# Patient Record
Sex: FEMALE | Race: BLACK OR AFRICAN AMERICAN | NOT HISPANIC OR LATINO | Employment: UNEMPLOYED | URBAN - METROPOLITAN AREA
[De-identification: names, ages, dates, MRNs, and addresses within clinical notes are randomized per-mention and may not be internally consistent; named-entity substitution may affect disease eponyms.]

---

## 2020-02-18 PROBLEM — J32.0 CHRONIC MAXILLARY SINUSITIS: Status: ACTIVE | Noted: 2020-02-18

## 2020-02-18 PROBLEM — J34.3 HYPERTROPHY OF INFERIOR NASAL TURBINATE: Status: ACTIVE | Noted: 2020-02-18

## 2021-11-01 PROBLEM — Z87.39 HISTORY OF JUVENILE RHEUMATOID ARTHRITIS: Status: ACTIVE | Noted: 2018-01-04

## 2022-02-08 PROBLEM — J01.10 ACUTE NON-RECURRENT FRONTAL SINUSITIS: Status: ACTIVE | Noted: 2022-02-08

## 2022-03-29 ENCOUNTER — OFFICE VISIT (OUTPATIENT)
Dept: GASTROENTEROLOGY | Facility: CLINIC | Age: 42
End: 2022-03-29
Payer: MEDICAID

## 2022-03-29 VITALS
BODY MASS INDEX: 36.73 KG/M2 | WEIGHT: 194.56 LBS | HEART RATE: 83 BPM | DIASTOLIC BLOOD PRESSURE: 80 MMHG | SYSTOLIC BLOOD PRESSURE: 118 MMHG | HEIGHT: 61 IN

## 2022-03-29 DIAGNOSIS — R07.9 CHEST PAIN, UNSPECIFIED TYPE: ICD-10-CM

## 2022-03-29 DIAGNOSIS — K21.9 GASTROESOPHAGEAL REFLUX DISEASE, UNSPECIFIED WHETHER ESOPHAGITIS PRESENT: ICD-10-CM

## 2022-03-29 DIAGNOSIS — K21.00 GASTROESOPHAGEAL REFLUX DISEASE WITH ESOPHAGITIS WITHOUT HEMORRHAGE: ICD-10-CM

## 2022-03-29 DIAGNOSIS — R07.9 CHEST PAIN AT REST: ICD-10-CM

## 2022-03-29 PROCEDURE — 3008F BODY MASS INDEX DOCD: CPT | Mod: CPTII,S$GLB,, | Performed by: INTERNAL MEDICINE

## 2022-03-29 PROCEDURE — 1160F PR REVIEW ALL MEDS BY PRESCRIBER/CLIN PHARMACIST DOCUMENTED: ICD-10-PCS | Mod: CPTII,S$GLB,, | Performed by: INTERNAL MEDICINE

## 2022-03-29 PROCEDURE — 3079F DIAST BP 80-89 MM HG: CPT | Mod: CPTII,S$GLB,, | Performed by: INTERNAL MEDICINE

## 2022-03-29 PROCEDURE — 1159F MED LIST DOCD IN RCRD: CPT | Mod: CPTII,S$GLB,, | Performed by: INTERNAL MEDICINE

## 2022-03-29 PROCEDURE — 3008F PR BODY MASS INDEX (BMI) DOCUMENTED: ICD-10-PCS | Mod: CPTII,S$GLB,, | Performed by: INTERNAL MEDICINE

## 2022-03-29 PROCEDURE — 99204 PR OFFICE/OUTPT VISIT, NEW, LEVL IV, 45-59 MIN: ICD-10-PCS | Mod: S$PBB,,, | Performed by: INTERNAL MEDICINE

## 2022-03-29 PROCEDURE — 3074F SYST BP LT 130 MM HG: CPT | Mod: CPTII,S$GLB,, | Performed by: INTERNAL MEDICINE

## 2022-03-29 PROCEDURE — 99999 PR PBB SHADOW E&M-EST. PATIENT-LVL IV: CPT | Mod: PBBFAC,,, | Performed by: INTERNAL MEDICINE

## 2022-03-29 PROCEDURE — 3074F PR MOST RECENT SYSTOLIC BLOOD PRESSURE < 130 MM HG: ICD-10-PCS | Mod: CPTII,S$GLB,, | Performed by: INTERNAL MEDICINE

## 2022-03-29 PROCEDURE — 99204 OFFICE O/P NEW MOD 45 MIN: CPT | Mod: S$PBB,,, | Performed by: INTERNAL MEDICINE

## 2022-03-29 PROCEDURE — 99999 PR PBB SHADOW E&M-EST. PATIENT-LVL IV: ICD-10-PCS | Mod: PBBFAC,,, | Performed by: INTERNAL MEDICINE

## 2022-03-29 PROCEDURE — 1160F RVW MEDS BY RX/DR IN RCRD: CPT | Mod: CPTII,S$GLB,, | Performed by: INTERNAL MEDICINE

## 2022-03-29 PROCEDURE — 1159F PR MEDICATION LIST DOCUMENTED IN MEDICAL RECORD: ICD-10-PCS | Mod: CPTII,S$GLB,, | Performed by: INTERNAL MEDICINE

## 2022-03-29 PROCEDURE — 3079F PR MOST RECENT DIASTOLIC BLOOD PRESSURE 80-89 MM HG: ICD-10-PCS | Mod: CPTII,S$GLB,, | Performed by: INTERNAL MEDICINE

## 2022-03-29 NOTE — ASSESSMENT & PLAN NOTE
Plan:   -Continue PPI Po daily   -EGD ordered   -GERD lifestyle modifications  -Patient was counseled on weight loss   -Avoid trigger foods

## 2022-03-29 NOTE — PROGRESS NOTES
Clinic Consult:  [facility] Consultation Note    Reason for Consult:  Diagnoses of Chest pain at rest, Gastroesophageal reflux disease with esophagitis without hemorrhage, Chest pain, unspecified type, and Gastroesophageal reflux disease, unspecified whether esophagitis present were pertinent to this visit.    PCP: Trista Key   [address] [facility] / [facility] [id number]    HPI:  This is a 41 y.o. female here for evaluation of GERD.   She was diagnosed with COVID-19 in 1/2022.   After her diagnosis, she began to experience chest pains.   She was evaluated in the ED.   Told her symptoms were not cardiac related.   I reviewed notes, and troponin, D Dimer and EKG were normal.   CTA was normal.   She was given Pepcid.   Pepcid did not work.   She was started on Prilosec and it appears to control symptoms.   She cut fried food, acidic foods, chocolate and mints from her diet.   She started walking and has lost at least 10 pounds.     ROS:  See above HPI         Medical History:   Past Medical History:   Diagnosis Date    Deviated septum     Encounter for general adult medical examination without abnormal findings 09/22/2014    Hyperkalemia        Surgical History:  Past Surgical History:   Procedure Laterality Date    PLANTAR FASCIA SURGERY      SINUS SURGERY         Family History:   Family History   Problem Relation Age of Onset    Cancer Mother     Diabetes Mother     Hypertension Mother     Heart disease Father     Hypertension Sister     Asthma Daughter        Social History:   Social History     Tobacco Use    Smoking status: Never Smoker    Smokeless tobacco: Never Used   Substance Use Topics    Alcohol use: Yes    Drug use: No       Allergies: Reviewed    Home Medications:   Medication List with Changes/Refills   Current Medications    FAMOTIDINE (PEPCID) 20 MG TABLET    Take 20 mg by mouth 2 (two) times daily.    FLUCONAZOLE (DIFLUCAN) 150 MG TAB    Take one tab po x 1 then  "repeat 48 hours    LEVONORGESTREL (MIRENA) 20 MCG/24 HOURS (5 YRS) 52 MG IUD    1 each by Intrauterine route once.    OMEPRAZOLE (PRILOSEC) 40 MG CAPSULE    Take 1 capsule (40 mg total) by mouth every morning.         Physical Exam:  Vital Signs:  /80   Pulse 83   Ht 5' 1" (1.549 m)   Wt 88.3 kg (194 lb 8.9 oz)   BMI 36.76 kg/m²   Body mass index is 36.76 kg/m².    Physical Exam  Constitutional:       Appearance: Normal appearance.   HENT:      Mouth/Throat:      Mouth: Mucous membranes are moist.   Eyes:      Conjunctiva/sclera: Conjunctivae normal.   Abdominal:      General: There is no distension.      Palpations: Abdomen is soft.      Tenderness: There is no abdominal tenderness. There is no guarding.   Neurological:      Mental Status: She is alert.          Labs: Pertinent labs reviewed.      Assessment:  Problem List Items Addressed This Visit        GI    GERD (gastroesophageal reflux disease)    Current Assessment & Plan     Plan:   -Continue PPI PO daily   -EGD ordered   -GERD lifestyle modifications  -Patient was counseled on weight loss   -Avoid trigger foods              Other    Chest pain      Other Visit Diagnoses               Gastroesophageal reflux disease with esophagitis without hemorrhage  -     Ambulatory referral/consult to Gastroenterology  -     Case Request Endoscopy: EGD (ESOPHAGOGASTRODUODENOSCOPY)    Chest pain, unspecified type                  No follow-ups on file.    Order summary:  No orders of the defined types were placed in this encounter.      Thank you so much for allowing me to participate in the care of Olegario Newsome MD  Gastroenterology and Hepatology  [facility]    "

## 2022-05-03 ENCOUNTER — ANESTHESIA EVENT (OUTPATIENT)
Dept: ENDOSCOPY | Facility: HOSPITAL | Age: 42
End: 2022-05-03
Payer: COMMERCIAL

## 2022-05-03 NOTE — ANESTHESIA PREPROCEDURE EVALUATION
05/03/2022  Olegario Presley is a 41 y.o., female.  Past Medical History:   Diagnosis Date    Deviated septum     Encounter for general adult medical examination without abnormal findings 09/22/2014    Hyperkalemia      Past Surgical History:   Procedure Laterality Date    PLANTAR FASCIA SURGERY      SINUS SURGERY             Pre-op Assessment    I have reviewed the Patient Summary Reports.     I have reviewed the Nursing Notes. I have reviewed the NPO Status.   I have reviewed the Medications.     Review of Systems  Anesthesia Hx:  No problems with previous Anesthesia  Denies Family Hx of Anesthesia complications.   Denies Personal Hx of Anesthesia complications.   Social:  Alcohol Use, Non-Smoker    Hematology/Oncology:  Hematology Normal   Oncology Normal     EENT/Dental:EENT/Dental Normal   Cardiovascular:  Cardiovascular Normal  Denies MI.  Denies CAD.     Denies Angina.    Pulmonary:  Pulmonary Normal    Renal/:  Renal/ Normal     Hepatic/GI:   GERD    Musculoskeletal:  Musculoskeletal Normal    Neurological:  Neurology Normal    Endocrine:  Obesity / BMI > 30  Dermatological:  Skin Normal    Psych:  Psychiatric Normal           Physical Exam  General: Well nourished, Cooperative, Alert and Oriented    Airway:  Mallampati: II   Mouth Opening: Normal  TM Distance: Normal  Tongue: Normal  Neck ROM: Normal ROM    Dental:  Intact        Anesthesia Plan  Type of Anesthesia, risks & benefits discussed:    Anesthesia Type: Gen Natural Airway  Intra-op Monitoring Plan: Standard ASA Monitors  Post Op Pain Control Plan: multimodal analgesia  Induction:  IV  Informed Consent: Informed consent signed with the Patient and all parties understand the risks and agree with anesthesia plan.  All questions answered. Patient consented to blood products? No  ASA Score: 2  Day of Surgery Review of History & Physical:  H&P Update referred to the surgeon/provider.    Ready For Surgery From Anesthesia Perspective.     .

## 2022-05-03 NOTE — PRE-PROCEDURE INSTRUCTIONS
PAT call completed and patient educated on procedure instructions. Medical history discussed and patient informed of arrival time 0930am on Thursday, May 03, at [facility].      Pt will be accompanied by mother and is made  aware of limited-visitor policy, and that  is to remain during entire visit. All questions and concerns addressed.      For any needs on morning of procedure, call nurse's desk at [phone].        Endoscopy instructions reviewed. Informed to take no AM medications.     Nothing by mouth 12MN on Wednesday night.     Patient verbalizes understanding of teaching and all instructions.       Your procedure will be performed at [facility]. Many GPS systems are NOT providing accurate instructions, take [address], from either direction, to [address] and proceed to the eastbound service road, turning between the [facility] and [facility]. Once at the [facility], look for signs directing you to Hospital/Surgery. Check in for your procedure at  for Hospital/Surgery. [phone].

## 2022-05-05 ENCOUNTER — ANESTHESIA (OUTPATIENT)
Dept: ENDOSCOPY | Facility: HOSPITAL | Age: 42
End: 2022-05-05
Payer: COMMERCIAL

## 2022-05-05 ENCOUNTER — HOSPITAL ENCOUNTER (OUTPATIENT)
Facility: HOSPITAL | Age: 42
Discharge: HOME | End: 2022-05-05
Attending: INTERNAL MEDICINE | Admitting: INTERNAL MEDICINE
Payer: COMMERCIAL

## 2022-05-05 VITALS
RESPIRATION RATE: 14 BRPM | HEART RATE: 78 BPM | DIASTOLIC BLOOD PRESSURE: 83 MMHG | SYSTOLIC BLOOD PRESSURE: 133 MMHG | BODY MASS INDEX: 36.13 KG/M2 | HEIGHT: 61 IN | OXYGEN SATURATION: 100 % | TEMPERATURE: 98 F | WEIGHT: 191.38 LBS

## 2022-05-05 DIAGNOSIS — K21.9 GASTROESOPHAGEAL REFLUX DISEASE, UNSPECIFIED WHETHER ESOPHAGITIS PRESENT: Primary | ICD-10-CM

## 2022-05-05 DIAGNOSIS — K21.9 GERD (GASTROESOPHAGEAL REFLUX DISEASE): ICD-10-CM

## 2022-05-05 PROCEDURE — D9220A PRA ANESTHESIA: Mod: CRNA,,, | Performed by: NURSE ANESTHETIST, CERTIFIED REGISTERED

## 2022-05-05 PROCEDURE — 63600175 PHARM REV CODE 636 W HCPCS: Performed by: INTERNAL MEDICINE

## 2022-05-05 PROCEDURE — 37000008 HC ANESTHESIA 1ST 15 MINUTES: Performed by: INTERNAL MEDICINE

## 2022-05-05 PROCEDURE — 27201012 HC FORCEPS, HOT/COLD, DISP: Performed by: INTERNAL MEDICINE

## 2022-05-05 PROCEDURE — 88305 TISSUE EXAM BY PATHOLOGIST: CPT | Performed by: PATHOLOGY

## 2022-05-05 PROCEDURE — 88305 TISSUE EXAM BY PATHOLOGIST: ICD-10-PCS | Mod: 26,,, | Performed by: PATHOLOGY

## 2022-05-05 PROCEDURE — 43239 EGD BIOPSY SINGLE/MULTIPLE: CPT | Mod: ,,, | Performed by: INTERNAL MEDICINE

## 2022-05-05 PROCEDURE — 43239 EGD BIOPSY SINGLE/MULTIPLE: CPT | Performed by: INTERNAL MEDICINE

## 2022-05-05 PROCEDURE — D9220A PRA ANESTHESIA: ICD-10-PCS | Mod: CRNA,,, | Performed by: NURSE ANESTHETIST, CERTIFIED REGISTERED

## 2022-05-05 PROCEDURE — 88305 TISSUE EXAM BY PATHOLOGIST: CPT | Mod: 26,,, | Performed by: PATHOLOGY

## 2022-05-05 PROCEDURE — D9220A PRA ANESTHESIA: ICD-10-PCS | Mod: ANES,,, | Performed by: ANESTHESIOLOGY

## 2022-05-05 PROCEDURE — 63600175 PHARM REV CODE 636 W HCPCS: Performed by: NURSE ANESTHETIST, CERTIFIED REGISTERED

## 2022-05-05 PROCEDURE — D9220A PRA ANESTHESIA: Mod: ANES,,, | Performed by: ANESTHESIOLOGY

## 2022-05-05 PROCEDURE — 43239 PR EGD, FLEX, W/BIOPSY, SGL/MULTI: ICD-10-PCS | Mod: ,,, | Performed by: INTERNAL MEDICINE

## 2022-05-05 RX ORDER — SODIUM CHLORIDE, SODIUM LACTATE, POTASSIUM CHLORIDE, CALCIUM CHLORIDE 600; 310; 30; 20 MG/100ML; MG/100ML; MG/100ML; MG/100ML
INJECTION, SOLUTION INTRAVENOUS CONTINUOUS
Status: DISCONTINUED | OUTPATIENT
Start: 2022-05-05 | End: 2022-05-05 | Stop reason: HOSPADM

## 2022-05-05 RX ORDER — LIDOCAINE HCL/PF 100 MG/5ML
SYRINGE (ML) INTRAVENOUS
Status: DISCONTINUED | OUTPATIENT
Start: 2022-05-05 | End: 2022-05-05

## 2022-05-05 RX ORDER — PROPOFOL 10 MG/ML
INJECTION, EMULSION INTRAVENOUS
Status: DISCONTINUED | OUTPATIENT
Start: 2022-05-05 | End: 2022-05-05

## 2022-05-05 RX ADMIN — SODIUM CHLORIDE, SODIUM LACTATE, POTASSIUM CHLORIDE, AND CALCIUM CHLORIDE: 600; 310; 30; 20 INJECTION, SOLUTION INTRAVENOUS at 10:05

## 2022-05-05 RX ADMIN — SODIUM CHLORIDE, SODIUM LACTATE, POTASSIUM CHLORIDE, AND CALCIUM CHLORIDE: 600; 310; 30; 20 INJECTION, SOLUTION INTRAVENOUS at 09:05

## 2022-05-05 RX ADMIN — PROPOFOL 150 MG: 10 INJECTION, EMULSION INTRAVENOUS at 11:05

## 2022-05-05 RX ADMIN — PROPOFOL 20 MG: 10 INJECTION, EMULSION INTRAVENOUS at 11:05

## 2022-05-05 RX ADMIN — Medication 50 MG: at 11:05

## 2022-05-05 NOTE — ANESTHESIA POSTPROCEDURE EVALUATION
Anesthesia Post Evaluation    Patient: Olegario Presley    Procedure(s) Performed: Procedure(s) (LRB):  EGD (ESOPHAGOGASTRODUODENOSCOPY) (N/A)    Final Anesthesia Type: general      Patient location during evaluation: PACU  Patient participation: Yes- Able to Participate  Level of consciousness: awake and alert and oriented  Post-procedure vital signs: reviewed and stable  Pain management: adequate  Airway patency: patent    PONV status at discharge: No PONV  Anesthetic complications: no      Cardiovascular status: blood pressure returned to baseline, stable and hemodynamically stable  Respiratory status: unassisted  Hydration status: euvolemic  Follow-up not needed.          Vitals Value Taken Time   /83 05/05/22 1142   Temp 36.5 °C (97.7 °F) 05/05/22 1112   Pulse 78 05/05/22 1142   Resp 19 05/05/22 1142   SpO2 100 % 05/05/22 1142   Vitals shown include unvalidated device data.      Event Time   Out of Recovery 11:42:00         Pain/Fernando Score: Fernando Score: 10 (5/5/2022 11:42 AM)

## 2022-05-05 NOTE — H&P
PRE PROCEDURE H&P    Patient Name: Olegario Presley  MRN: [id number]  : 1980  Date of Procedure:  2022  Referring Physician: Trista Key MD  Primary Physician: Trista Key MD  Procedure Physician: Shreyas Newsome MD       Planned Procedure: EGD  Diagnosis: GERD  Chief Complaint: Same as above    HPI:   This is a 41 y.o. female here for evaluation of GERD.   She was diagnosed with COVID-19 in 2022.   After her diagnosis, she began to experience chest pains.   She was evaluated in the ED.   Told her symptoms were not cardiac related.   I reviewed notes, and troponin, D Dimer and EKG were normal.   CTA was normal.   She was given Pepcid.   Pepcid did not work.   She was started on Prilosec and it appears to control symptoms.   She cut fried food, acidic foods, chocolate and mints from her diet.   She started walking and has lost at least 10 pounds.          Past Medical History:   Past Medical History:   Diagnosis Date    Deviated septum     Encounter for general adult medical examination without abnormal findings 2014    Hyperkalemia         Past Surgical History:  Past Surgical History:   Procedure Laterality Date    PLANTAR FASCIA SURGERY      SINUS SURGERY          Home Medications:  Prior to Admission medications    Medication Sig Start Date End Date Taking? Authorizing Provider   omeprazole (PRILOSEC) 40 MG capsule Take 1 capsule (40 mg total) by mouth every morning. 22  Yes Trista Key MD   famotidine (PEPCID) 20 MG tablet Take 20 mg by mouth 2 (two) times daily. 22   Historical Provider   fluconazole (DIFLUCAN) 150 MG Tab Take one tab po x 1 then repeat 48 hours  Patient not taking: Reported on 3/29/2022 2/8/22   Trista Key MD   levonorgestrel (MIRENA) 20 mcg/24 hours (5 yrs) 52 mg IUD 1 each by Intrauterine route once.    Historical Provider        Allergies:  Review of patient's allergies indicates:  No Known Allergies     Social History:  "  Social History     Socioeconomic History    Marital status:    Tobacco Use    Smoking status: Never Smoker    Smokeless tobacco: Never Used   Substance and Sexual Activity    Alcohol use: Yes    Drug use: No    Sexual activity: Yes     Partners: Male     Birth control/protection: I.U.D.       Family History:  Family History   Problem Relation Age of Onset    Cancer Mother     Diabetes Mother     Hypertension Mother     Heart disease Father     Hypertension Sister     Asthma Daughter        ROS: No acute cardiac events, no acute respiratory complaints.     Physical Exam (all patients):    BP (!) 150/89 (BP Location: Right arm, Patient Position: Sitting)   Pulse 77   Temp 97.7 °F (36.5 °C) (Temporal)   Resp 16   Ht 5' 1" (1.549 m)   Wt 86.8 kg (191 lb 5.8 oz)   LMP 04/01/2022 (Approximate)   SpO2 100%   Breastfeeding No   BMI 36.16 kg/m²   Lungs: Clear to auscultation bilaterally, respirations unlabored  Heart: Regular rate and rhythm, S1 and S2 normal, no obvious murmurs  Abdomen:         Soft, non-tender, bowel sounds normal, no masses, no organomegaly    Lab Results   Component Value Date    WBC 5.3 09/28/2017    MCV 84 09/28/2017    RDW 15.3 09/28/2017     09/28/2017        SEDATION PLAN: per anesthesia      History reviewed, vital signs satisfactory, cardiopulmonary status satisfactory, sedation options, risks and plans have been discussed with the patient  All their questions were answered and the patient agrees to the sedation procedures as planned and the patient is deemed an appropriate candidate for the sedation as planned.    Procedure explained to patient, informed consent obtained and placed in chart.    Shreyas Newsome  5/5/2022  10:49 AM     "

## 2022-05-05 NOTE — PROVATION PATIENT INSTRUCTIONS
Discharge Summary/Instructions after an Endoscopic Procedure  Patient Name: Olegario Presley  Patient MRN: [id number]  Patient YOB: 1980  Thursday, May 5, 2022  Shreyas Newsome MD  Dear patient,  As a result of recent federal legislation (The Federal Cures Act), you may   receive lab or pathology results from your procedure in your [facility]   account before your physician is able to contact you. Your physician or   their representative will relay the results to you with their   recommendations at their soonest availability.  Thank you,  RESTRICTIONS:  During your procedure today, you received medications for sedation.  These   medications may affect your judgment, balance and coordination.  Therefore,   for 24 hours, you have the following restrictions:   - DO NOT drive a car, operate machinery, make legal/financial decisions,   sign important papers or drink alcohol.    ACTIVITY:  Today: no heavy lifting, straining or running due to procedural   sedation/anesthesia.  The following day: return to full activity including work.  DIET:  Eat and drink normally unless instructed otherwise.     TREATMENT FOR COMMON SIDE EFFECTS:  - Mild abdominal pain, nausea, belching, bloating or excessive gas:  rest,   eat lightly and use a heating pad.  - Sore Throat: treat with throat lozenges and/or gargle with warm salt   water.  - Because air was used during the procedure, expelling large amounts of air   from your rectum or belching is normal.  - If a bowel prep was taken, you may not have a bowel movement for 1-3 days.    This is normal.  SYMPTOMS TO WATCH FOR AND REPORT TO YOUR PHYSICIAN:  1. Abdominal pain or bloating, other than gas cramps.  2. Chest pain.  3. Back pain.  4. Signs of infection such as: chills or fever occurring within 24 hours   after the procedure.  5. Rectal bleeding, which would show as bright red, maroon, or black stools.   (A tablespoon of blood from the rectum is not serious, especially  if   hemorrhoids are present.)  6. Vomiting.  7. Weakness or dizziness.  GO DIRECTLY TO THE NEAREST EMERGENCY ROOM IF YOU HAVE ANY OF THE FOLLOWING:      Difficulty breathing              Chills and/or fever over 101 F   Persistent vomiting and/or vomiting blood   Severe abdominal pain   Severe chest pain   Black, tarry stools   Bleeding- more than one tablespoon   Any other symptom or condition that you feel may need urgent attention  Your doctor recommends these additional instructions:  If any biopsies were taken, your doctors clinic will contact you in 1 to 2   weeks with any results.  - Discharge patient to home.   - Resume previous diet.   - Continue present medications.   - Await pathology results.   - Return to referring physician.  For questions, problems or results please call your physician Shreyas Newsome MD at Work:  [phone]  If you have any questions about the above instructions, call the GI   department at [phone] or call the endoscopy unit at [phone]   from 7am until 3 pm.  [facility], EMERGENCY ROOM PHONE NUMBER:   [phone]  IF A COMPLICATION OR EMERGENCY SITUATION ARISES AND YOU ARE UNABLE TO REACH   YOUR PHYSICIAN - GO DIRECTLY TO THE EMERGENCY ROOM.  I have read or have had read to me these discharge instructions for my   procedure and have received a written copy.  I understand these   instructions and will follow-up with my physician if I have any questions.     __________________________________       _____________________________________  Nurse Signature                                          Patient/Designated   Responsible Party Signature  Shreyas Newsome MD  5/5/2022 11:09:21 AM  This report has been verified and signed electronically.  Dear patient,  As a result of recent federal legislation (The Federal Cures Act), you may   receive lab or pathology results from your procedure in your [facility]   account before your  physician is able to contact you. Your physician or   their representative will relay the results to you with their   recommendations at their soonest availability.  Thank you,  PROVATION

## 2022-05-05 NOTE — TRANSFER OF CARE
"Anesthesia Transfer of Care Note    Patient: Olegario Presley    Procedure(s) Performed: Procedure(s) (LRB):  EGD (ESOPHAGOGASTRODUODENOSCOPY) (N/A)    Patient location: PACU    Anesthesia Type: general    Transport from OR: Transported from OR on room air with adequate spontaneous ventilation    Post pain: adequate analgesia    Post assessment: no apparent anesthetic complications and tolerated procedure well    Post vital signs: stable    Level of consciousness: awake    Nausea/Vomiting: no nausea/vomiting    Complications: none    Transfer of care protocol was followed      Last vitals:   Visit Vitals  BP (!) 150/89 (BP Location: Right arm, Patient Position: Sitting)   Pulse 77   Temp 36.5 °C (97.7 °F) (Temporal)   Resp 16   Ht 5' 1" (1.549 m)   Wt 86.8 kg (191 lb 5.8 oz)   LMP 04/01/2022 (Approximate)   SpO2 100%   Breastfeeding No   BMI 36.16 kg/m²     "

## 2022-05-12 LAB
FINAL PATHOLOGIC DIAGNOSIS: NORMAL
GROSS: NORMAL
Lab: NORMAL

## 2022-05-16 PROBLEM — J01.10 ACUTE NON-RECURRENT FRONTAL SINUSITIS: Status: RESOLVED | Noted: 2022-02-08 | Resolved: 2022-05-16

## 2022-08-08 PROBLEM — R06.02 SHORTNESS OF BREATH: Status: ACTIVE | Noted: 2022-08-08

## 2022-08-08 PROBLEM — F41.1 GAD (GENERALIZED ANXIETY DISORDER): Status: ACTIVE | Noted: 2022-08-08

## 2022-08-08 PROBLEM — R00.2 PALPITATIONS: Status: ACTIVE | Noted: 2022-08-08

## 2022-09-02 PROBLEM — T78.40XA ALLERGIC REACTION CAUSED BY A DRUG: Status: ACTIVE | Noted: 2022-09-02

## 2022-10-17 PROBLEM — A08.4 VIRAL GASTROENTERITIS: Status: ACTIVE | Noted: 2022-10-17

## 2023-03-28 ENCOUNTER — PATIENT MESSAGE (OUTPATIENT)
Dept: RESEARCH | Facility: HOSPITAL | Age: 43
End: 2023-03-28
Payer: COMMERCIAL

## 2023-04-20 PROBLEM — A08.4 VIRAL GASTROENTERITIS: Status: RESOLVED | Noted: 2022-10-17 | Resolved: 2023-04-20

## 2023-04-20 PROBLEM — R06.02 SHORTNESS OF BREATH: Status: RESOLVED | Noted: 2022-08-08 | Resolved: 2023-04-20

## 2023-04-20 PROBLEM — R07.9 CHEST PAIN: Status: RESOLVED | Noted: 2022-03-29 | Resolved: 2023-04-20

## 2023-04-20 PROBLEM — R00.2 PALPITATIONS: Status: RESOLVED | Noted: 2022-08-08 | Resolved: 2023-04-20

## 2023-06-02 PROBLEM — E66.01 SEVERE OBESITY (BMI 35.0-39.9) WITH COMORBIDITY: Status: ACTIVE | Noted: 2023-06-02

## 2023-06-05 PROBLEM — I10 PRIMARY HYPERTENSION: Status: ACTIVE | Noted: 2023-06-05

## 2023-06-05 PROBLEM — E78.2 MIXED HYPERLIPIDEMIA: Status: ACTIVE | Noted: 2023-06-05

## 2023-06-05 PROBLEM — E88.819 INSULIN RESISTANCE: Status: ACTIVE | Noted: 2023-06-05

## 2023-07-06 PROBLEM — R00.2 PALPITATIONS: Status: ACTIVE | Noted: 2023-07-06

## 2024-07-22 PROBLEM — M54.12 CERVICAL RADICULOPATHY: Status: ACTIVE | Noted: 2024-07-22

## 2024-07-31 NOTE — PROGRESS NOTES
New Patient Chronic Pain Note (Initial Visit)    Referring Physician: Trista Key MD    PCP: Trista Key MD    Chief Complaint:   Chief Complaint   Patient presents with    Neck Pain        SUBJECTIVE:    Olegario Presley is a 43 y.o. female with past medical history significant for generalized anxiety disorder, hyperlipidemia, hypertension, obesity, insulin resistance, GERD, sleep apnea who presents to the clinic for the evaluation of neck, head, arm and left knee pain.  Patient reports neck pain has been present since June without inciting accident injury or trauma.  Pain is intermittent and today is rated a 2/10.  Pain is described as sharp and stabbing in nature.  Patient reports pain in bilateral cervical paraspinous musculature which can radiate superiorly and greater and lesser occipital nerve distributions.  She does report generalized fatigue in the upper extremities and numbness and tingling in the fingertips but denies outright cervical radiculopathy.  Pain in the neck and arms can be exacerbated with cervical flexion/extension lateral rotation as well as use of the upper extremities.  Pain can be improved with heat, sitting still and recent prescription of gabapentin from her PCP, Dr. Slater.  Patient is currently taking gabapentin 300 mg twice daily.  She denies any side effects from this medication.  Patient has been performing physician directed physical therapy exercises for neck and arm pain over the last 8 weeks from 06/01/2024 through 08/01/2024 with marginal improvement in her symptoms.  Patient has not received prior interventional treatment.    She also reports new onset left knee pain.  This pain has been present for 1 day without inciting accident injury or trauma.  Patient reports pain along the suprapatellar aspect of the left knee, which is worse with standing and ambulation.  She denies significant lower extremity weakness.  She has never received interventional treatment  for knee pain.    Patient denies night fever/night sweats, urinary incontinence, bowel incontinence, significant weight loss, and significant motor weakness.  Patient reports loss of sensations.      Pain Disability Index Review:         8/1/2024     8:07 AM   Last 3 PDI Scores   Pain Disability Index (PDI) 12       Non-Pharmacologic Treatments:  Physical Therapy/Home Exercise: yes  Ice/Heat:yes  TENS: no  Acupuncture: no  Massage: no  Chiropractic: no    Other: no      Pain Medications:  - Adjuvant Medications: Cyclobenzaprine (Flexeril) and Neurontin (Gabapentin)    Pain Procedures:   None    Past Medical History:   Diagnosis Date    Allergy     Deviated septum     Encounter for general adult medical examination without abnormal findings 09/22/2014    GERD (gastroesophageal reflux disease)     Hyperkalemia     Sleep apnea      Past Surgical History:   Procedure Laterality Date    ESOPHAGOGASTRODUODENOSCOPY N/A 5/5/2022    Procedure: EGD (ESOPHAGOGASTRODUODENOSCOPY);  Surgeon: Shreyas Newsome MD;  Location: [facility];  Service: Gastroenterology;  Laterality: N/A;    PLANTAR FASCIA SURGERY      SINUS SURGERY       Review of patient's allergies indicates:  No Known Allergies    Current Outpatient Medications   Medication Sig    busPIRone (BUSPAR) 7.5 MG tablet Take 1 tablet (7.5 mg total) by mouth 2 (two) times a day.    cyclobenzaprine (FLEXERIL) 10 MG tablet Take 1 tablet (10 mg total) by mouth every evening. for 10 days    gabapentin (NEURONTIN) 300 MG capsule Take 1 capsule (300 mg total) by mouth 2 (two) times daily.    levonorgestrel (MIRENA) 20 mcg/24 hours (5 yrs) 52 mg IUD 1 each by Intrauterine route once.    metFORMIN (GLUCOPHAGE-XR) 500 MG ER 24hr tablet 4 tabs poqd    metoprolol succinate (TOPROL XL) 50 MG 24 hr tablet Take 1 tablet (50 mg total) by mouth every evening.     No current facility-administered medications for this visit.         ROS:  GENERAL:  No weight loss, malaise or fevers.  HEENT:   " No recent changes in vision or hearing  NECK:  Negative for lumps, no difficulty with swallowing.  RESPIRATORY:  Negative for cough, wheezing or shortness of breath, patient denies any recent URI.  CARDIOVASCULAR:  Negative for chest pain or palpitations.  GI:  Negative for abdominal discomfort, blood in stools or black stools or change in bowel habits.  MUSCULOSKELETAL:  See HPI.  SKIN:  Negative for lesions, rash, and itching.  PSYCH:  No mood disorder or recent psychosocial stressors.   HEMATOLOGY/LYMPHOLOGY:  Negative for prolonged bleeding, bruising easily or swollen nodes.    NEURO:   No history of syncope, paralysis, seizures or tremors.  All other reviewed and negative other than HPI.    OBJECTIVE:    /78   Pulse (P) 78   Resp 17   Ht 5' 1" (1.549 m)   Wt 81 kg (178 lb 9.2 oz)   LMP  (LMP Unknown)   BMI 33.74 kg/m²       Physical Exam:    GENERAL: Well appearing, in no acute distress, alert and oriented x3.  PSYCH:  Mood and affect appropriate.  SKIN: Skin color, texture, turgor normal, no rashes or lesions.  HEAD/FACE:  Normocephalic, atraumatic. Cranial nerves grossly intact.    NECK:  pain to palpation over the cervical paraspinous muscles. Spurling Negative.  pain with neck flexion, extension, or lateral flexion. Full ROM  Normaltesting biceps, triceps and brachioradialis bilaterally.    NegativeHoffmann's bilaterally.    5/5 strength testing deltoid, biceps, triceps, wrist extensor, wrist flexor and ulnar intrinsics bilaterally.    Normal  strength bilaterally    CV: RRR with palpation of the radial artery.  PULM: No evidence of respiratory difficulty, symmetric chest rise.  GI:  Soft and non-tender.    NEURO: Bilateral upper and lower extremity coordination and muscle stretch reflexes are physiologic and symmetric. No loss of sensation is noted.  GAIT: normal.    Imaging:         ASSESSMENT: 43 y.o. year old female with     1. Acute pain of left knee  X-Ray Knee AP Standing Bilateral "      2. Cervical spondylosis  Ambulatory referral/consult to Pain Clinic    Ambulatory referral/consult to Physical/Occupational Therapy      3. Cervical radiculopathy  Ambulatory referral/consult to Pain Clinic    Ambulatory referral/consult to Physical/Occupational Therapy      4. Cervical radiculopathy due to degenerative joint disease of spine  Ambulatory referral/consult to Pain Clinic    Ambulatory referral/consult to Physical/Occupational Therapy          PLAN:   - Interventions:  We discussed considering bilateral C4-6 cervical medial branch block to address axial neck pain versus cervical epidural to address radiculopathy.  We will 1st review cervical MRI.. Explained the risks and benefits of the procedure in detail with the patient today in clinic along with alternative treatment options    - Anticoagulation use: No no anticoagulation     report:  Reviewed and consistent with medication use as prescribed.    - Medications:  -  We have discussed increasing the patient's gabapentin to a more therapeutic goal.  Patient will increase medication according to the following algorithm.  We have reviewed potential side effects of this medication including daytime somnolence, weight gain and peripheral edema  -Gabapentin 600 mg BID    - Therapy:   We discussed initiating physical therapy to help manage the patient/s painful condition. The patient was counseled that muscle strengthening will improve the long term prognosis in regards to pain and may also help increase range of motion and mobility. They were told that one of the goals of physical therapy is that they learn how to do the exercises so that they can do them independently at home daily upon completion. The patient's questions were answered and they were agreeable to this course. A referral for physical therapy was provided to the patient. Patient prefers [place] location.    - Imaging: Reviewed available imaging (cervical x-ray) with patient and  answered any questions they had regarding study.  MRI cervical spine and bilateral knee XR to better evaluate pain     - Follow up visit: return to clinic in 4-6 weeks.  Virtual visit.  Alee Saul PA-C      The above plan and management options were discussed at length with patient. Patient is in agreement with the above and verbalized understanding.    - I discussed the goals of interventional chronic pain management with the patient on today's visit. We discussed a multimodal and systematic approach to pain.  This includes diagnostic and therapeutic injections, adjuvant pharmacologic treatment, physical therapy, and at times psychiatry.  I emphasized the importance of regular exercise, core strengthening and stretching, diet and weight loss as a cornerstone of long-term pain management.    - This condition does not require this patient to take time off of work, and the primary goal of our Pain Management services is to improve the patient's functional capacity.  - Patient Questions: Answered all of the patient's questions regarding diagnoses, therapy, treatment and next steps        Norma Swartz MD  Interventional Pain Management  [facility]    Disclaimer:  This note was prepared using voice recognition system and is likely to have sound alike errors that may have been overlooked even after proof reading.  Please call me with any questions

## 2024-08-01 ENCOUNTER — HOSPITAL ENCOUNTER (OUTPATIENT)
Dept: RADIOLOGY | Facility: HOSPITAL | Age: 44
Discharge: HOME | End: 2024-08-01
Attending: ANESTHESIOLOGY
Payer: COMMERCIAL

## 2024-08-01 ENCOUNTER — OFFICE VISIT (OUTPATIENT)
Dept: PAIN MEDICINE | Facility: CLINIC | Age: 44
End: 2024-08-01
Payer: COMMERCIAL

## 2024-08-01 VITALS
SYSTOLIC BLOOD PRESSURE: 138 MMHG | RESPIRATION RATE: 17 BRPM | HEIGHT: 61 IN | BODY MASS INDEX: 33.71 KG/M2 | WEIGHT: 178.56 LBS | DIASTOLIC BLOOD PRESSURE: 78 MMHG

## 2024-08-01 DIAGNOSIS — M47.22 CERVICAL RADICULOPATHY DUE TO DEGENERATIVE JOINT DISEASE OF SPINE: ICD-10-CM

## 2024-08-01 DIAGNOSIS — M25.562 ACUTE PAIN OF LEFT KNEE: Primary | ICD-10-CM

## 2024-08-01 DIAGNOSIS — M47.812 CERVICAL SPONDYLOSIS: ICD-10-CM

## 2024-08-01 DIAGNOSIS — M25.562 ACUTE PAIN OF LEFT KNEE: ICD-10-CM

## 2024-08-01 DIAGNOSIS — M54.12 CERVICAL RADICULOPATHY: ICD-10-CM

## 2024-08-01 PROCEDURE — 73565 X-RAY EXAM OF KNEES: CPT | Mod: TC,FY,PO

## 2024-08-01 PROCEDURE — 3044F HG A1C LEVEL LT 7.0%: CPT | Mod: CPTII,S$GLB,, | Performed by: ANESTHESIOLOGY

## 2024-08-01 PROCEDURE — 3075F SYST BP GE 130 - 139MM HG: CPT | Mod: CPTII,S$GLB,, | Performed by: ANESTHESIOLOGY

## 2024-08-01 PROCEDURE — 73565 X-RAY EXAM OF KNEES: CPT | Mod: 26,,, | Performed by: RADIOLOGY

## 2024-08-01 PROCEDURE — 3066F NEPHROPATHY DOC TX: CPT | Mod: CPTII,S$GLB,, | Performed by: ANESTHESIOLOGY

## 2024-08-01 PROCEDURE — 3061F NEG MICROALBUMINURIA REV: CPT | Mod: CPTII,S$GLB,, | Performed by: ANESTHESIOLOGY

## 2024-08-01 PROCEDURE — 1159F MED LIST DOCD IN RCRD: CPT | Mod: CPTII,S$GLB,, | Performed by: ANESTHESIOLOGY

## 2024-08-01 PROCEDURE — 99204 OFFICE O/P NEW MOD 45 MIN: CPT | Mod: S$GLB,,, | Performed by: ANESTHESIOLOGY

## 2024-08-01 PROCEDURE — 3078F DIAST BP <80 MM HG: CPT | Mod: CPTII,S$GLB,, | Performed by: ANESTHESIOLOGY

## 2024-08-01 PROCEDURE — 99999 PR PBB SHADOW E&M-EST. PATIENT-LVL V: CPT | Mod: PBBFAC,,, | Performed by: ANESTHESIOLOGY

## 2024-08-01 PROCEDURE — 1160F RVW MEDS BY RX/DR IN RCRD: CPT | Mod: CPTII,S$GLB,, | Performed by: ANESTHESIOLOGY

## 2024-08-01 PROCEDURE — 3008F BODY MASS INDEX DOCD: CPT | Mod: CPTII,S$GLB,, | Performed by: ANESTHESIOLOGY

## 2024-08-05 ENCOUNTER — CLINICAL SUPPORT (OUTPATIENT)
Dept: REHABILITATION | Facility: HOSPITAL | Age: 44
End: 2024-08-05
Attending: ANESTHESIOLOGY
Payer: COMMERCIAL

## 2024-08-05 DIAGNOSIS — R29.898 RIGHT ARM WEAKNESS: Primary | ICD-10-CM

## 2024-08-05 DIAGNOSIS — M47.22 CERVICAL RADICULOPATHY DUE TO DEGENERATIVE JOINT DISEASE OF SPINE: ICD-10-CM

## 2024-08-05 DIAGNOSIS — M47.812 CERVICAL SPONDYLOSIS: ICD-10-CM

## 2024-08-05 DIAGNOSIS — M54.12 CERVICAL RADICULOPATHY: ICD-10-CM

## 2024-08-08 ENCOUNTER — HOSPITAL ENCOUNTER (OUTPATIENT)
Dept: RADIOLOGY | Facility: HOSPITAL | Age: 44
Discharge: HOME | End: 2024-08-08
Attending: INTERNAL MEDICINE
Payer: COMMERCIAL

## 2024-08-08 DIAGNOSIS — M54.2 NECK PAIN: ICD-10-CM

## 2024-08-08 DIAGNOSIS — M47.22 CERVICAL RADICULOPATHY DUE TO DEGENERATIVE JOINT DISEASE OF SPINE: ICD-10-CM

## 2024-08-08 DIAGNOSIS — R07.9 CHEST PAIN AT REST: ICD-10-CM

## 2024-08-08 DIAGNOSIS — M54.12 CERVICAL RADICULOPATHY: ICD-10-CM

## 2024-08-08 DIAGNOSIS — R20.0 NUMBNESS OF FINGERS: ICD-10-CM

## 2024-08-08 DIAGNOSIS — H92.01 RIGHT EAR PAIN: ICD-10-CM

## 2024-08-08 DIAGNOSIS — R51.9 RIGHT-SIDED FACE PAIN: ICD-10-CM

## 2024-08-08 PROCEDURE — 72141 MRI NECK SPINE W/O DYE: CPT | Mod: TC

## 2024-08-08 PROCEDURE — 72141 MRI NECK SPINE W/O DYE: CPT | Mod: 26,,, | Performed by: RADIOLOGY

## 2024-08-14 NOTE — PROGRESS NOTES
"[facility]   Physical Therapy Treatment Note     Name: Olegario Presley  Clinic Number: [id number]    Therapy Diagnosis: No diagnosis found.  Physician: Norma Swartz MD    Visit Date: 8/15/2024    Physician Orders: PT Eval and Treat  Medical Diagnosis from Referral: Cervical spondylosis [M47.812], Cervical radiculopathy [M54.12], Cervical radiculopathy due to degenerative joint disease of spine [M47.22]   Evaluation Date: 8/5/2024  Authorization Period Expiration: 12/31/2024  Plan of Care Expiration: 10/4/24  Progress Note Due: 9/5/24  Visit # / Visits authorized: 1/10 treatment +eval  FOTO: 1/3 (last performed 8/5/2024)     PRECAUTIONS: Standard      PTA Visit #: -/5     Time In: 5:30  Time Out: 6:30  Total Billable Time: 60 minutes    SUBJECTIVE     Pt reports: she was sore following last session from dry needling. Improved pain the next day. Pt states she had some pain with her HEP.     She was compliant with home exercise program.  Response to previous treatment: sore  Functional change: NA    Pain: -/10  Location: -    OBJECTIVE     Objective Measures updated at progress report unless specified.     Treatment     Olegario received the treatments listed below:      CPT Intervention  JointFocus Duration / Intensity  8/15 Duration / Intensity  8/5   MT FDN B UT  B UT   MT Mobs Cervical traction and gentle R upglides  FMP for crot  Cervical traction and gentle R upglides    NMR  Shoulder rolls 2x10   2x10    NMR  prone Scapular retraction 2x10 c 5" holds   2x10  Seated     NMR  supine cervical nods 2x10 c 5" holds   2x10    TE  UT stretch, B 3x30"   3x30    NMR  supine HSA 2x10, RTB       NMR  supine ER  2x10, RTB      NMR  Vince shoulder ext 2x10, RTB     PLAN            CPT Codes available for Billing:   (15) minutes of Manual therapy (MT) to improve pain and ROM.  (5) minutes of Therapeutic Exercise (TE) to develop strength, endurance, range of motion, and flexibility.  (40) minutes of " Neuromuscular Re-Education (NR)  to improve: Balance, Coordination, Kinesthetic, Sense, Proprioception, and Posture.  (-) minutes of Therapeutic Activities (TA) to improve functional performance.  (-) minutes of Gait Training (GT) to improve functional mobility and safety.  Unattended Electrical Stimulation (ES) for muscle performance or pain modulation.  Vasopneumatic Device Therapy () for management of swelling/edema. (22021)  BFR: Blood flow restriction applied during exercise  NP or (-): Not Performed    Patient Education and Home Exercises     Home Exercises Provided and Patient Education Provided     Education provided:   - Continue HEP    Written Home Exercises Provided: Patient instructed to cont prior HEP. Exercises were reviewed and Olegario was able to demonstrate them prior to the end of the session.  Olegario demonstrated good  understanding of the education provided. See EMR under Patient Instructions for exercises provided during therapy sessions    See EMR under Patient Instructions for exercises provided     ASSESSMENT     Performed manual to decrease tension and improve joint mobility. Performed functional movement pattern to improve cervical rotation. Reviewed HEP for proper technique. Incorporated additional postural strengthening exercises.     Olegario Is progressing well towards her goals.   Pt prognosis is Good.     Pt will continue to benefit from skilled outpatient physical therapy to address the deficits listed in the problem list box on initial evaluation, provide pt/family education and to maximize pt's level of independence in the home and community environment.     Pt's spiritual, cultural and educational needs considered and pt agreeable to plan of care and goals.     Anticipated barriers to physical therapy: none    GOALS:      Short Term Goals:  4 weeks Status  Date Met   PAIN: Pt will report worst pain of 5/10 in order to progress toward max functional ability and improve quality of life.  [x] Progressing  [] Met  [] Not Met     FUNCTION: Patient will improve functional outcome test score by 8 points  [x] Progressing  [] Met  [] Not Met     MOBILITY: Patient will improve cervical rotation AROM by 50 percent in order to progress towards independence with functional activities.  [x] Progressing  [] Met  [] Not Met     STRENGTH: Patient will improve strength by 1 grade  in order to progress towards independence with functional activities. [x] Progressing  [] Met  [] Not Met     POSTURE: Patient will apply proper body mechanics during sitting, standing, and light activities to decrease pain and promote long term stability.  [x] Progressing  [] Met  [] Not Met     HEP: Patient will demonstrate independence with HEP in order to progress toward functional independence. [x] Progressing  [] Met  [] Not Met        Long Term Goals:  8 weeks Status Date Met   PAIN: Pt will report worst pain of 1-2/10 in order to progress toward max functional ability and improve quality of life [x] Progressing  [] Met  [] Not Met     FUNCTION: Patient will demonstrate improved function as indicated by an increase of 12 points on FOTO [x] Progressing  [] Met  [] Not Met     MOBILITY: Patient will improve cervical AROM to WFL without pain in order to return to maximal functional potential and improve quality of life.  [x] Progressing  [] Met  [] Not Met     STRENGTH: Patient will improve strength to 4+/5 without pain in order to improve functional independence and quality of life. [x] Progressing  [] Met  [] Not Met     Patient will return to normal ADL's, IADL's, community involvement, recreational activities, and work-related activities with less than or equal to 1-2/10 pain and maximal function.  [x] Progressing  [] Met  [] Not Met           PLAN   Plan of care Certification: 8/5/2024 to 10/4/24     Outpatient Physical Therapy 2 times weekly for 8 weeks to include any combination of the following interventions: virtual visits,  dry needling, modalities, electrical stimulation (IFC, Pre-Mod, Attended with Functional Dry Needling), Electrical Stimulation  , Manual Therapy, Moist Heat/ Ice, Neuromuscular Re-ed, Patient Education, Self Care, Therapeutic Exercise, FDN, Functional Training, and Therapeutic Activites      Thank you for this referral.    Karen Quan, PT

## 2024-08-15 ENCOUNTER — CLINICAL SUPPORT (OUTPATIENT)
Dept: REHABILITATION | Facility: HOSPITAL | Age: 44
End: 2024-08-15
Payer: COMMERCIAL

## 2024-08-15 DIAGNOSIS — M54.12 CERVICAL RADICULOPATHY: Primary | Chronic | ICD-10-CM

## 2024-08-15 DIAGNOSIS — R29.898 RIGHT ARM WEAKNESS: ICD-10-CM

## 2024-08-15 PROCEDURE — 97140 MANUAL THERAPY 1/> REGIONS: CPT | Mod: PN

## 2024-08-15 PROCEDURE — 97112 NEUROMUSCULAR REEDUCATION: CPT | Mod: PN

## 2024-08-21 ENCOUNTER — CLINICAL SUPPORT (OUTPATIENT)
Dept: REHABILITATION | Facility: HOSPITAL | Age: 44
End: 2024-08-21
Payer: COMMERCIAL

## 2024-08-21 DIAGNOSIS — R29.898 RIGHT ARM WEAKNESS: ICD-10-CM

## 2024-08-21 DIAGNOSIS — M54.12 CERVICAL RADICULOPATHY: Primary | Chronic | ICD-10-CM

## 2024-08-21 PROCEDURE — 97112 NEUROMUSCULAR REEDUCATION: CPT | Mod: PN

## 2024-08-21 PROCEDURE — 97110 THERAPEUTIC EXERCISES: CPT | Mod: PN

## 2024-08-21 PROCEDURE — 97140 MANUAL THERAPY 1/> REGIONS: CPT | Mod: PN

## 2024-08-21 NOTE — PROGRESS NOTES
"[facility]   Physical Therapy Treatment Note     Name: Olegario Presley  Clinic Number: [id number]    Therapy Diagnosis:   Encounter Diagnoses   Name Primary?    Cervical radiculopathy Yes    Right arm weakness      Physician: Norma Swartz MD    Visit Date: 8/21/2024    Physician Orders: PT Eval and Treat  Medical Diagnosis from Referral: Cervical spondylosis [M47.812], Cervical radiculopathy [M54.12], Cervical radiculopathy due to degenerative joint disease of spine [M47.22]   Evaluation Date: 8/5/2024  Authorization Period Expiration: 12/31/2024  Plan of Care Expiration: 10/4/24  Progress Note Due: 9/5/24  Visit # / Visits authorized: 2/10 treatment +eval  FOTO: 1/3 (last performed 8/5/2024)     PRECAUTIONS: Standard      PTA Visit #: -/5     Time In: 5:00  Time Out: 6:00  Total Billable Time: 60 minutes    SUBJECTIVE     Pt reports: soreness for one day following last session. No pain today.     She was compliant with home exercise program.  Response to previous treatment: sore  Functional change: NA    Pain: -/10  Location: -    OBJECTIVE     Objective Measures updated at progress report unless specified.     Treatment     Olegario received the treatments listed below:      CPT Intervention  JointFocus Duration / Intensity  8/21 Duration / Intensity  8/15 Duration / Intensity  8/5   MT FDN B UT B UT  B UT   MT Mobs Cervical traction and gentle R upglides Cervical traction and gentle R upglides  FMP for crot  Cervical traction and gentle R upglides    TE UBE 6 min        NMR  Shoulder rolls 2x10 2x10   2x10    NMR  prone Scapular retraction 2x10 c 5 holds 2x10 c 5" holds   2x10  Seated     NMR  supine cervical nods 2x10 c 5 holds 2x10 c 5" holds   2x10    TE  UT stretch, B 3x30 3x30"   3x30    NMR  supine HSA 3x10, RTB 2x10, RTB       NMR  supine ER  3x10, RTB 2x10, RTB      NMR  Vince shoulder ext 3x10, RTB 2x10, RTB       M/H to cervical  10 minutes        PLAN              CPT Codes " available for Billing:   (10) minutes of Manual therapy (MT) to improve pain and ROM.  (10) minutes of Therapeutic Exercise (TE) to develop strength, endurance, range of motion, and flexibility.  (40) minutes of Neuromuscular Re-Education (NR)  to improve: Balance, Coordination, Kinesthetic, Sense, Proprioception, and Posture.  (-) minutes of Therapeutic Activities (TA) to improve functional performance.  (-) minutes of Gait Training (GT) to improve functional mobility and safety.  Unattended Electrical Stimulation (ES) for muscle performance or pain modulation.  Vasopneumatic Device Therapy () for management of swelling/edema. (72493)  BFR: Blood flow restriction applied during exercise  NP or (-): Not Performed    Patient Education and Home Exercises     Home Exercises Provided and Patient Education Provided     Education provided:   - Continue HEP    Written Home Exercises Provided: Patient instructed to cont prior HEP. Exercises were reviewed and Olegario was able to demonstrate them prior to the end of the session.  Olegario demonstrated good  understanding of the education provided. See EMR under Patient Instructions for exercises provided during therapy sessions    See EMR under Patient Instructions for exercises provided     ASSESSMENT     Pt with good tolerance to progression of exercises. Performed dry needling to bilateral upper trap; pt with good muscle twitch response to both points. Provided moist heat at end of session to decrease tension. Continue to progress as tolerated.     Olegario Is progressing well towards her goals.   Pt prognosis is Good.     Pt will continue to benefit from skilled outpatient physical therapy to address the deficits listed in the problem list box on initial evaluation, provide pt/family education and to maximize pt's level of independence in the home and community environment.     Pt's spiritual, cultural and educational needs considered and pt agreeable to plan of care and  goals.     Anticipated barriers to physical therapy: none    GOALS:      Short Term Goals:  4 weeks Status  Date Met   PAIN: Pt will report worst pain of 5/10 in order to progress toward max functional ability and improve quality of life. [x] Progressing  [] Met  [] Not Met     FUNCTION: Patient will improve functional outcome test score by 8 points  [x] Progressing  [] Met  [] Not Met     MOBILITY: Patient will improve cervical rotation AROM by 50 percent in order to progress towards independence with functional activities.  [x] Progressing  [] Met  [] Not Met     STRENGTH: Patient will improve strength by 1 grade  in order to progress towards independence with functional activities. [x] Progressing  [] Met  [] Not Met     POSTURE: Patient will apply proper body mechanics during sitting, standing, and light activities to decrease pain and promote long term stability.  [x] Progressing  [] Met  [] Not Met     HEP: Patient will demonstrate independence with HEP in order to progress toward functional independence. [x] Progressing  [] Met  [] Not Met        Long Term Goals:  8 weeks Status Date Met   PAIN: Pt will report worst pain of 1-2/10 in order to progress toward max functional ability and improve quality of life [x] Progressing  [] Met  [] Not Met     FUNCTION: Patient will demonstrate improved function as indicated by an increase of 12 points on FOTO [x] Progressing  [] Met  [] Not Met     MOBILITY: Patient will improve cervical AROM to WFL without pain in order to return to maximal functional potential and improve quality of life.  [x] Progressing  [] Met  [] Not Met     STRENGTH: Patient will improve strength to 4+/5 without pain in order to improve functional independence and quality of life. [x] Progressing  [] Met  [] Not Met     Patient will return to normal ADL's, IADL's, community involvement, recreational activities, and work-related activities with less than or equal to 1-2/10 pain and maximal  function.  [x] Progressing  [] Met  [] Not Met           PLAN   Plan of care Certification: 8/5/2024 to 10/4/24     Outpatient Physical Therapy 2 times weekly for 8 weeks to include any combination of the following interventions: virtual visits, dry needling, modalities, electrical stimulation (IFC, Pre-Mod, Attended with Functional Dry Needling), Electrical Stimulation  , Manual Therapy, Moist Heat/ Ice, Neuromuscular Re-ed, Patient Education, Self Care, Therapeutic Exercise, FDN, Functional Training, and Therapeutic Activites      Thank you for this referral.    Karen Quan, PT

## 2024-09-03 ENCOUNTER — CLINICAL SUPPORT (OUTPATIENT)
Dept: REHABILITATION | Facility: HOSPITAL | Age: 44
End: 2024-09-03
Payer: COMMERCIAL

## 2024-09-03 DIAGNOSIS — R29.898 RIGHT ARM WEAKNESS: ICD-10-CM

## 2024-09-03 DIAGNOSIS — M54.12 CERVICAL RADICULOPATHY: Primary | Chronic | ICD-10-CM

## 2024-09-03 PROCEDURE — 97112 NEUROMUSCULAR REEDUCATION: CPT | Mod: PN

## 2024-09-03 PROCEDURE — 97110 THERAPEUTIC EXERCISES: CPT | Mod: PN

## 2024-09-03 PROCEDURE — 97140 MANUAL THERAPY 1/> REGIONS: CPT | Mod: PN

## 2024-09-04 NOTE — PROGRESS NOTES
"[facility]   Physical Therapy Treatment Note     Name: Olegario Presley  Clinic Number: [id number]    Therapy Diagnosis:   Encounter Diagnoses   Name Primary?    Cervical radiculopathy Yes    Right arm weakness      Physician: Norma Swartz MD    Visit Date: 9/3/2024    Physician Orders: PT Eval and Treat  Medical Diagnosis from Referral: Cervical spondylosis [M47.812], Cervical radiculopathy [M54.12], Cervical radiculopathy due to degenerative joint disease of spine [M47.22]   Evaluation Date: 8/5/2024  Authorization Period Expiration: 12/31/2024  Plan of Care Expiration: 10/4/24  Progress Note Due: 9/5/24  Visit # / Visits authorized: 3/10 treatment +eval  FOTO: 1/3 (last performed 8/5/2024)     PRECAUTIONS: Standard      PTA Visit #: -/5     Time In: 5:30  Time Out: 6:30  Total Billable Time: 60 minutes    SUBJECTIVE     Pt reports: Pain to right cervical region yesterday. States she was cleaning Saturday and Sunday, however, did not do anything in particular to irritate her neck.    She was compliant with home exercise program.  Response to previous treatment: sore  Functional change: NA    Pain: -/10  Location: -    OBJECTIVE     Objective Measures updated at progress report unless specified.     Treatment     Olegario received the treatments listed below:      CPT Intervention  JointFocus Duration / Intensity  9/3 Duration / Intensity  8/21 Duration / Intensity  8/15   MT FDN B UT, right levator  B UT B UT   MT Mobs - Cervical traction and gentle R upglides Cervical traction and gentle R upglides  FMP for crot   TE UBE 3'/3' 6 min      NMR  Shoulder rolls 2x10 2x10 2x10     NMR  prone Scapular retraction 3x10 2x10 c 5 holds 2x10 c 5" holds     NMR  supine cervical nods NT 2x10 c 5 holds 2x10 c 5" holds    NMR Prop cervical retraction with band 2x10, RTB        TE  UT stretch, B   3x30 3x30"     TE Levator str  3x30"        NMR  supine HSA 3x10, RTB 3x10, RTB 2x10, RTB     NMR  " supine ER  3x10, RTB 3x10, RTB 2x10, RTB    NMR  Vince shoulder ext 3x10, RTB 3x10, RTB 2x10, RTB     M/H to cervical  10 minutes 10 minutes      PLAN              CPT Codes available for Billing:   (10) minutes of Manual therapy (MT) to improve pain and ROM.  (10) minutes of Therapeutic Exercise (TE) to develop strength, endurance, range of motion, and flexibility.  (30) minutes of Neuromuscular Re-Education (NR)  to improve: Balance, Coordination, Kinesthetic, Sense, Proprioception, and Posture.  (-) minutes of Therapeutic Activities (TA) to improve functional performance.  (-) minutes of Gait Training (GT) to improve functional mobility and safety.  Unattended Electrical Stimulation (ES) for muscle performance or pain modulation.  Vasopneumatic Device Therapy () for management of swelling/edema. (64136)  BFR: Blood flow restriction applied during exercise  NP or (-): Not Performed    Patient Education and Home Exercises     Home Exercises Provided and Patient Education Provided     Education provided:   - Continue HEP    Written Home Exercises Provided: Patient instructed to cont prior HEP. Exercises were reviewed and Olegario was able to demonstrate them prior to the end of the session.  Olegario demonstrated good  understanding of the education provided. See EMR under Patient Instructions for exercises provided during therapy sessions    See EMR under Patient Instructions for exercises provided     ASSESSMENT     Pt presents to clinic with increase pain to right upper scapular region. Performed dry needling to right levator; good muscle twitch noted to area. Provided heat to area at end of session. Assess response to dry needling right levator next visit.     Olegario Is progressing well towards her goals.   Pt prognosis is Good.     Pt will continue to benefit from skilled outpatient physical therapy to address the deficits listed in the problem list box on initial evaluation, provide pt/family education and to  maximize pt's level of independence in the home and community environment.     Pt's spiritual, cultural and educational needs considered and pt agreeable to plan of care and goals.     Anticipated barriers to physical therapy: none    GOALS:      Short Term Goals:  4 weeks Status  Date Met   PAIN: Pt will report worst pain of 5/10 in order to progress toward max functional ability and improve quality of life. [x] Progressing  [] Met  [] Not Met     FUNCTION: Patient will improve functional outcome test score by 8 points  [x] Progressing  [] Met  [] Not Met     MOBILITY: Patient will improve cervical rotation AROM by 50 percent in order to progress towards independence with functional activities.  [x] Progressing  [] Met  [] Not Met     STRENGTH: Patient will improve strength by 1 grade  in order to progress towards independence with functional activities. [x] Progressing  [] Met  [] Not Met     POSTURE: Patient will apply proper body mechanics during sitting, standing, and light activities to decrease pain and promote long term stability.  [x] Progressing  [] Met  [] Not Met     HEP: Patient will demonstrate independence with HEP in order to progress toward functional independence. [x] Progressing  [] Met  [] Not Met        Long Term Goals:  8 weeks Status Date Met   PAIN: Pt will report worst pain of 1-2/10 in order to progress toward max functional ability and improve quality of life [x] Progressing  [] Met  [] Not Met     FUNCTION: Patient will demonstrate improved function as indicated by an increase of 12 points on FOTO [x] Progressing  [] Met  [] Not Met     MOBILITY: Patient will improve cervical AROM to WFL without pain in order to return to maximal functional potential and improve quality of life.  [x] Progressing  [] Met  [] Not Met     STRENGTH: Patient will improve strength to 4+/5 without pain in order to improve functional independence and quality of life. [x] Progressing  [] Met  [] Not Met      Patient will return to normal ADL's, IADL's, community involvement, recreational activities, and work-related activities with less than or equal to 1-2/10 pain and maximal function.  [x] Progressing  [] Met  [] Not Met           PLAN   Plan of care Certification: 8/5/2024 to 10/4/24     Outpatient Physical Therapy 2 times weekly for 8 weeks to include any combination of the following interventions: virtual visits, dry needling, modalities, electrical stimulation (IFC, Pre-Mod, Attended with Functional Dry Needling), Electrical Stimulation  , Manual Therapy, Moist Heat/ Ice, Neuromuscular Re-ed, Patient Education, Self Care, Therapeutic Exercise, FDN, Functional Training, and Therapeutic Activites      Thank you for this referral.    Karen Quan, PT

## 2024-09-10 ENCOUNTER — CLINICAL SUPPORT (OUTPATIENT)
Dept: REHABILITATION | Facility: HOSPITAL | Age: 44
End: 2024-09-10
Payer: COMMERCIAL

## 2024-09-10 DIAGNOSIS — R29.898 RIGHT ARM WEAKNESS: ICD-10-CM

## 2024-09-10 DIAGNOSIS — M54.12 CERVICAL RADICULOPATHY: Primary | Chronic | ICD-10-CM

## 2024-09-10 PROCEDURE — 97112 NEUROMUSCULAR REEDUCATION: CPT | Mod: PN

## 2024-09-10 PROCEDURE — 97110 THERAPEUTIC EXERCISES: CPT | Mod: PN

## 2024-09-10 PROCEDURE — 97140 MANUAL THERAPY 1/> REGIONS: CPT | Mod: PN

## 2024-09-10 NOTE — PROGRESS NOTES
[facility]   Physical Therapy Treatment Note     Name: Olegario Presley  Clinic Number: [id number]    Therapy Diagnosis:   Encounter Diagnoses   Name Primary?    Cervical radiculopathy Yes    Right arm weakness        Physician: Norma Swartz MD    Visit Date: 9/10/2024    Physician Orders: PT Eval and Treat  Medical Diagnosis from Referral: Cervical spondylosis [M47.812], Cervical radiculopathy [M54.12], Cervical radiculopathy due to degenerative joint disease of spine [M47.22]   Evaluation Date: 8/5/2024  Authorization Period Expiration: 12/31/2024  Plan of Care Expiration: 10/4/24  Progress Note Due: 9/5/24  Visit # / Visits authorized: 4/10 treatment +eval  FOTO: 1/3 (last performed 8/5/2024)     PRECAUTIONS: Standard      PTA Visit #: -/5     Time In: 5:30  Time Out: 6:30  Total Billable Time: 60 minutes    SUBJECTIVE     Pt reports: Primarily note pain with turning her head to the left today. States she did feel slightly swollen to right upper trap region following last session.    She was compliant with home exercise program.  Response to previous treatment: sore  Functional change: NA    Pain: -/10  Location: -    OBJECTIVE     Objective Measures updated at progress report unless specified.     9/10/24  Cervical flexion = DP pull to right cerivcal   Cervical extension = DN 60% limited   Cervical side bend = (B) DP 40% limited   Cervical rotation = ( R) DN 15% limited; (L) DP 15% limited     Treatment     Olegario received the treatments listed below:      CPT Intervention  JointFocus Duration / Intensity  9/10 Duration / Intensity  9/3 Duration / Intensity  8/21   MT FDN   B UT, right levator  B UT   MT Mobs   - Cervical traction and gentle R upglides   TE UBE 3'/3 3'/3' 6 min    NMR  supine cervical nods + lifts  2x10  NT supine cervical nods  2x10 c 5 holds    NMR  prone TYI's   x10ea     prone Scapular retraction  3x10 2x10 c 5 holds   NMR Prop cervical retraction with band 2x10  "GTB  2x10, RTB      NMR  Shoulder rolls - 2x10 2x10    TE  UT stretch, B -   3x30   TE Levator str  - 3x30"      NMR  supine HSA - 3x10, RTB 3x10, RTB    NMR  supine ER  - 3x10, RTB 3x10, RTB   NMR  Vince shoulder ext - 3x10, RTB 3x10, RTB     M/H to cervical  10 minutes 10 minutes 10 minutes    PLAN              CPT Codes available for Billing:   (24) minutes of Manual therapy (MT) to improve pain and ROM.  (8) minutes of Therapeutic Exercise (TE) to develop strength, endurance, range of motion, and flexibility.  (13) minutes of Neuromuscular Re-Education (NR)  to improve: Balance, Coordination, Kinesthetic, Sense, Proprioception, and Posture.  (-) minutes of Therapeutic Activities (TA) to improve functional performance.  (-) minutes of Gait Training (GT) to improve functional mobility and safety.  Unattended Electrical Stimulation (ES) for muscle performance or pain modulation.  Vasopneumatic Device Therapy () for management of swelling/edema. (77072)  BFR: Blood flow restriction applied during exercise  NP or (-): Not Performed    Patient Education and Home Exercises     Home Exercises Provided and Patient Education Provided     Education provided:   - Continue HEP    Written Home Exercises Provided: Patient instructed to cont prior HEP. Exercises were reviewed and Olegario was able to demonstrate them prior to the end of the session.  Olegario demonstrated good  understanding of the education provided. See EMR under Patient Instructions for exercises provided during therapy sessions    See EMR under Patient Instructions for exercises provided     ASSESSMENT     Pt presents to clinic with increased pain to right lower cervical region. Perform soft tissue massage to right upper trap and levator followed by cervical mobilization and manual stretching; patient with moderate tissue adhesion to area. Pain noted with prone cervical retraction; cues to decrease intensity. Patient with increased soreness to right lower " cervical towards end of session. Provided heat to reduce symptoms.    Olegario Is progressing well towards her goals.   Pt prognosis is Good.     Pt will continue to benefit from skilled outpatient physical therapy to address the deficits listed in the problem list box on initial evaluation, provide pt/family education and to maximize pt's level of independence in the home and community environment.     Pt's spiritual, cultural and educational needs considered and pt agreeable to plan of care and goals.     Anticipated barriers to physical therapy: none    GOALS:      Short Term Goals:  4 weeks Status  Date Met   PAIN: Pt will report worst pain of 5/10 in order to progress toward max functional ability and improve quality of life. [] Progressing  [x] Met  [] Not Met 9/10    FUNCTION: Patient will improve functional outcome test score by 8 points  [x] Progressing  [] Met  [] Not Met     MOBILITY: Patient will improve cervical rotation AROM by 50 percent in order to progress towards independence with functional activities.  [] Progressing  [x] Met  [] Not Met 9/10     STRENGTH: Patient will improve strength by 1 grade  in order to progress towards independence with functional activities. [x] Progressing  [] Met  [] Not Met     POSTURE: Patient will apply proper body mechanics during sitting, standing, and light activities to decrease pain and promote long term stability.  [x] Progressing  [] Met  [] Not Met     HEP: Patient will demonstrate independence with HEP in order to progress toward functional independence. [] Progressing  [x] Met  [] Not Met 9/10        Long Term Goals:  8 weeks Status Date Met   PAIN: Pt will report worst pain of 1-2/10 in order to progress toward max functional ability and improve quality of life [x] Progressing  [] Met  [] Not Met     FUNCTION: Patient will demonstrate improved function as indicated by an increase of 12 points on FOTO [x] Progressing  [] Met  [] Not Met     MOBILITY: Patient  will improve cervical AROM to WFL without pain in order to return to maximal functional potential and improve quality of life.  [x] Progressing  [] Met  [] Not Met     STRENGTH: Patient will improve strength to 4+/5 without pain in order to improve functional independence and quality of life. [x] Progressing  [] Met  [] Not Met     Patient will return to normal ADL's, IADL's, community involvement, recreational activities, and work-related activities with less than or equal to 1-2/10 pain and maximal function.  [x] Progressing  [] Met  [] Not Met           PLAN   Plan of care Certification: 8/5/2024 to 10/4/24     Outpatient Physical Therapy 2 times weekly for 8 weeks to include any combination of the following interventions: virtual visits, dry needling, modalities, electrical stimulation (IFC, Pre-Mod, Attended with Functional Dry Needling), Electrical Stimulation  , Manual Therapy, Moist Heat/ Ice, Neuromuscular Re-ed, Patient Education, Self Care, Therapeutic Exercise, FDN, Functional Training, and Therapeutic Activites      Thank you for this referral.    Karen Quan, PT

## 2024-09-12 ENCOUNTER — CLINICAL SUPPORT (OUTPATIENT)
Dept: REHABILITATION | Facility: HOSPITAL | Age: 44
End: 2024-09-12
Payer: COMMERCIAL

## 2024-09-12 DIAGNOSIS — M54.12 CERVICAL RADICULOPATHY: Primary | Chronic | ICD-10-CM

## 2024-09-12 DIAGNOSIS — R29.898 RIGHT ARM WEAKNESS: ICD-10-CM

## 2024-09-12 PROCEDURE — 97112 NEUROMUSCULAR REEDUCATION: CPT | Mod: PN

## 2024-09-12 PROCEDURE — 97140 MANUAL THERAPY 1/> REGIONS: CPT | Mod: PN

## 2024-09-12 PROCEDURE — 97110 THERAPEUTIC EXERCISES: CPT | Mod: PN

## 2024-09-13 NOTE — PROGRESS NOTES
"  [facility]   Physical Therapy Treatment Note     Name: Olegario Presley  Clinic Number: [id number]    Therapy Diagnosis:   Encounter Diagnoses   Name Primary?    Cervical radiculopathy Yes    Right arm weakness        Physician: Norma Swartz MD    Visit Date: 9/12/2024    Physician Orders: PT Eval and Treat  Medical Diagnosis from Referral: Cervical spondylosis [M47.812], Cervical radiculopathy [M54.12], Cervical radiculopathy due to degenerative joint disease of spine [M47.22]   Evaluation Date: 8/5/2024  Authorization Period Expiration: 12/31/2024  Plan of Care Expiration: 10/4/24  Progress Note Due: 9/5/24  Visit # / Visits authorized: 5/10 treatment +eval  FOTO: 1/3 (last performed 8/5/2024)     PRECAUTIONS: Standard      PTA Visit #: -/5     Time In: 5:30  Time Out: 6:30  Total Billable Time: 60 minutes    SUBJECTIVE     Pt reports: Soreness following last session. Resolved next day. Pt states she felt like the heat helped a lot    She was compliant with home exercise program.  Response to previous treatment: sore  Functional change: NA    Pain: -/10  Location: -    OBJECTIVE     Objective Measures updated at progress report unless specified.     9/10/24  Cervical flexion = DP pull to right cerivcal   Cervical extension = DN 60% limited   Cervical side bend = (B) DP 40% limited   Cervical rotation = ( R) DN 15% limited; (L) DP 15% limited     Treatment     Olegario received the treatments listed below:    CPT Intervention  JointFocus Duration / Intensity  9/12   MT FDN     MT manual STM to left cervical   TE UBE 3'/3    NMR  supine cervical nods + lifts  2x10    NMR  prone TYI's  10x   NMR Prop cervical retraction with band 2x10 GTB     NMR  Shoulder rolls 2x10    TE  UT stretch, B 3x30"   TE Levator str  3x30"    NMR  supine HSA 3x10, RTB    NMR  supine ER  3x10, RTB   NMR  Vince shoulder ext       M/H to cervical  10 minutes    PLAN          CPT Codes available for Billing:   (15) " minutes of Manual therapy (MT) to improve pain and ROM.  (10) minutes of Therapeutic Exercise (TE) to develop strength, endurance, range of motion, and flexibility.  (35) minutes of Neuromuscular Re-Education (NR)  to improve: Balance, Coordination, Kinesthetic, Sense, Proprioception, and Posture.  (-) minutes of Therapeutic Activities (TA) to improve functional performance.  (-) minutes of Gait Training (GT) to improve functional mobility and safety.  Unattended Electrical Stimulation (ES) for muscle performance or pain modulation.  Vasopneumatic Device Therapy () for management of swelling/edema. (38852)  BFR: Blood flow restriction applied during exercise  NP or (-): Not Performed       Patient Education and Home Exercises     Home Exercises Provided and Patient Education Provided     Education provided:   - Continue HEP    Written Home Exercises Provided: Patient instructed to cont prior HEP. Exercises were reviewed and Olegario was able to demonstrate them prior to the end of the session.  Olegario demonstrated good  understanding of the education provided. See EMR under Patient Instructions for exercises provided during therapy sessions    See EMR under Patient Instructions for exercises provided     ASSESSMENT     Pt with decreased tension to left cervical compared to last session. Resumed postural strengthening exercises during today's session. Decrease cues required for proper technique. Continue to progress as tolerated.     Olegario Is progressing well towards her goals.   Pt prognosis is Good.     Pt will continue to benefit from skilled outpatient physical therapy to address the deficits listed in the problem list box on initial evaluation, provide pt/family education and to maximize pt's level of independence in the home and community environment.     Pt's spiritual, cultural and educational needs considered and pt agreeable to plan of care and goals.     Anticipated barriers to physical therapy:  none    GOALS:      Short Term Goals:  4 weeks Status  Date Met   PAIN: Pt will report worst pain of 5/10 in order to progress toward max functional ability and improve quality of life. [] Progressing  [x] Met  [] Not Met 9/10    FUNCTION: Patient will improve functional outcome test score by 8 points  [x] Progressing  [] Met  [] Not Met     MOBILITY: Patient will improve cervical rotation AROM by 50 percent in order to progress towards independence with functional activities.  [] Progressing  [x] Met  [] Not Met 9/10     STRENGTH: Patient will improve strength by 1 grade  in order to progress towards independence with functional activities. [x] Progressing  [] Met  [] Not Met     POSTURE: Patient will apply proper body mechanics during sitting, standing, and light activities to decrease pain and promote long term stability.  [x] Progressing  [] Met  [] Not Met     HEP: Patient will demonstrate independence with HEP in order to progress toward functional independence. [] Progressing  [x] Met  [] Not Met 9/10        Long Term Goals:  8 weeks Status Date Met   PAIN: Pt will report worst pain of 1-2/10 in order to progress toward max functional ability and improve quality of life [x] Progressing  [] Met  [] Not Met     FUNCTION: Patient will demonstrate improved function as indicated by an increase of 12 points on FOTO [x] Progressing  [] Met  [] Not Met     MOBILITY: Patient will improve cervical AROM to WFL without pain in order to return to maximal functional potential and improve quality of life.  [x] Progressing  [] Met  [] Not Met     STRENGTH: Patient will improve strength to 4+/5 without pain in order to improve functional independence and quality of life. [x] Progressing  [] Met  [] Not Met     Patient will return to normal ADL's, IADL's, community involvement, recreational activities, and work-related activities with less than or equal to 1-2/10 pain and maximal function.  [x] Progressing  [] Met  [] Not Met            PLAN   Plan of care Certification: 8/5/2024 to 10/4/24     Outpatient Physical Therapy 2 times weekly for 8 weeks to include any combination of the following interventions: virtual visits, dry needling, modalities, electrical stimulation (IFC, Pre-Mod, Attended with Functional Dry Needling), Electrical Stimulation  , Manual Therapy, Moist Heat/ Ice, Neuromuscular Re-ed, Patient Education, Self Care, Therapeutic Exercise, FDN, Functional Training, and Therapeutic Activites      Thank you for this referral.    Karen Quan, PT

## 2024-09-17 ENCOUNTER — CLINICAL SUPPORT (OUTPATIENT)
Dept: REHABILITATION | Facility: HOSPITAL | Age: 44
End: 2024-09-17
Payer: COMMERCIAL

## 2024-09-17 DIAGNOSIS — M54.12 CERVICAL RADICULOPATHY: Primary | Chronic | ICD-10-CM

## 2024-09-17 DIAGNOSIS — R29.898 RIGHT ARM WEAKNESS: ICD-10-CM

## 2024-09-17 PROCEDURE — 97140 MANUAL THERAPY 1/> REGIONS: CPT | Mod: PN

## 2024-09-17 PROCEDURE — 97110 THERAPEUTIC EXERCISES: CPT | Mod: PN

## 2024-09-17 PROCEDURE — 97112 NEUROMUSCULAR REEDUCATION: CPT | Mod: PN

## 2024-09-18 NOTE — PROGRESS NOTES
[facility]   Physical Therapy Treatment Note     Name: Olegario Presley  Clinic Number: [id number]    Therapy Diagnosis:   Encounter Diagnoses   Name Primary?    Cervical radiculopathy Yes    Right arm weakness        Physician: Norma Swartz MD    Visit Date: 9/17/2024    Physician Orders: PT Eval and Treat  Medical Diagnosis from Referral: Cervical spondylosis [M47.812], Cervical radiculopathy [M54.12], Cervical radiculopathy due to degenerative joint disease of spine [M47.22]   Evaluation Date: 8/5/2024  Authorization Period Expiration: 12/31/2024  Plan of Care Expiration: 10/4/24  Progress Note Due: 9/5/24  Visit # / Visits authorized: 6/10 treatment +eval  FOTO: 1/3 (last performed 8/5/2024)     PRECAUTIONS: Standard      PTA Visit #: -/5     Time In: 5:30  Time Out: 6:30  Total Billable Time: 60 minutes    SUBJECTIVE     Pt reports: She is in a good bit of pain today. States she was watching her daughter lacrose game and had her head turned to the left for an extended period of time Sunday. She has been having lingering pain since.     She was compliant with home exercise program.  Response to previous treatment: sore  Functional change: NA    Pain: -/10  Location: -    OBJECTIVE     Objective Measures updated at progress report unless specified.     9/10/24  Cervical flexion = DP pull to right cerivcal   Cervical extension = DN 60% limited   Cervical side bend = (B) DP 40% limited   Cervical rotation = ( R) DN 15% limited; (L) DP 15% limited     Treatment     Olegario received the treatments listed below:    CPT Intervention  JointFocus Duration / Intensity   9/17 Duration / Intensity  9/12 Duration / Intensity  9/10   MT manual Gentle cervical mobs   STM to cervical  Cervical traction  STM to left cervical     TE UBE 3'/3 3'/3 3'/3    NMR  supine cervical nods + lifts  2x10 2x10 2x10    NMR  prone TYI's  2x10 10x  x10ea       NMR Prop cervical retraction with band 2x10 GTB  2x10  "GTB  2x10 GTB     NMR  Shoulder rolls 2x10 2x10 -    TE  UT stretch, B 3x30" 3x30" -   TE Levator str  3x30" 3x30" -    NMR  supine HSA 3x10, RTB 3x10, RTB -    NMR  supine ER  3x10, RTB 3x10, RTB -   NMR  Vince shoulder ext     -     M/H to cervical  10 minutes  10 minutes  10 minutes   PLAN  add cervical isometrics              CPT Codes available for Billing:   (15) minutes of Manual therapy (MT) to improve pain and ROM.  (10) minutes of Therapeutic Exercise (TE) to develop strength, endurance, range of motion, and flexibility.  (35) minutes of Neuromuscular Re-Education (NR)  to improve: Balance, Coordination, Kinesthetic, Sense, Proprioception, and Posture.  (-) minutes of Therapeutic Activities (TA) to improve functional performance.  (-) minutes of Gait Training (GT) to improve functional mobility and safety.  Unattended Electrical Stimulation (ES) for muscle performance or pain modulation.  Vasopneumatic Device Therapy () for management of swelling/edema. (49984)  BFR: Blood flow restriction applied during exercise  NP or (-): Not Performed       Patient Education and Home Exercises     Home Exercises Provided and Patient Education Provided     Education provided:   - Continue HEP    Written Home Exercises Provided: Patient instructed to cont prior HEP. Exercises were reviewed and Olegario was able to demonstrate them prior to the end of the session.  Olegario demonstrated good  understanding of the education provided. See EMR under Patient Instructions for exercises provided during therapy sessions    See EMR under Patient Instructions for exercises provided     ASSESSMENT     Held progression of exercises secondary to increase pain Sunday. Pain with scapular depression during upper trap and levator stretches. Pt demonstrated relief following gentle manual therapy. Resume and progress next visit.      Olegario Is progressing well towards her goals.   Pt prognosis is Good.     Pt will continue to benefit from skilled " outpatient physical therapy to address the deficits listed in the problem list box on initial evaluation, provide pt/family education and to maximize pt's level of independence in the home and community environment.     Pt's spiritual, cultural and educational needs considered and pt agreeable to plan of care and goals.     Anticipated barriers to physical therapy: none    GOALS:      Short Term Goals:  4 weeks Status  Date Met   PAIN: Pt will report worst pain of 5/10 in order to progress toward max functional ability and improve quality of life. [] Progressing  [x] Met  [] Not Met 9/10    FUNCTION: Patient will improve functional outcome test score by 8 points  [x] Progressing  [] Met  [] Not Met     MOBILITY: Patient will improve cervical rotation AROM by 50 percent in order to progress towards independence with functional activities.  [] Progressing  [x] Met  [] Not Met 9/10     STRENGTH: Patient will improve strength by 1 grade  in order to progress towards independence with functional activities. [x] Progressing  [] Met  [] Not Met     POSTURE: Patient will apply proper body mechanics during sitting, standing, and light activities to decrease pain and promote long term stability.  [x] Progressing  [] Met  [] Not Met     HEP: Patient will demonstrate independence with HEP in order to progress toward functional independence. [] Progressing  [x] Met  [] Not Met 9/10        Long Term Goals:  8 weeks Status Date Met   PAIN: Pt will report worst pain of 1-2/10 in order to progress toward max functional ability and improve quality of life [x] Progressing  [] Met  [] Not Met     FUNCTION: Patient will demonstrate improved function as indicated by an increase of 12 points on FOTO [x] Progressing  [] Met  [] Not Met     MOBILITY: Patient will improve cervical AROM to WFL without pain in order to return to maximal functional potential and improve quality of life.  [x] Progressing  [] Met  [] Not Met     STRENGTH: Patient  will improve strength to 4+/5 without pain in order to improve functional independence and quality of life. [x] Progressing  [] Met  [] Not Met     Patient will return to normal ADL's, IADL's, community involvement, recreational activities, and work-related activities with less than or equal to 1-2/10 pain and maximal function.  [x] Progressing  [] Met  [] Not Met           PLAN   Plan of care Certification: 8/5/2024 to 10/4/24     Outpatient Physical Therapy 2 times weekly for 8 weeks to include any combination of the following interventions: virtual visits, dry needling, modalities, electrical stimulation (IFC, Pre-Mod, Attended with Functional Dry Needling), Electrical Stimulation  , Manual Therapy, Moist Heat/ Ice, Neuromuscular Re-ed, Patient Education, Self Care, Therapeutic Exercise, FDN, Functional Training, and Therapeutic Activites      Thank you for this referral.    Karen Quan, PT

## 2024-10-01 ENCOUNTER — CLINICAL SUPPORT (OUTPATIENT)
Dept: REHABILITATION | Facility: HOSPITAL | Age: 44
End: 2024-10-01
Payer: COMMERCIAL

## 2024-10-01 DIAGNOSIS — M54.12 CERVICAL RADICULOPATHY: Primary | Chronic | ICD-10-CM

## 2024-10-01 DIAGNOSIS — R29.898 RIGHT ARM WEAKNESS: ICD-10-CM

## 2024-10-01 PROCEDURE — 97112 NEUROMUSCULAR REEDUCATION: CPT | Mod: PN

## 2024-10-01 PROCEDURE — 97140 MANUAL THERAPY 1/> REGIONS: CPT | Mod: PN

## 2024-10-01 PROCEDURE — 97014 ELECTRIC STIMULATION THERAPY: CPT | Mod: PN

## 2024-10-01 PROCEDURE — 97110 THERAPEUTIC EXERCISES: CPT | Mod: PN

## 2024-10-02 NOTE — PROGRESS NOTES
[facility]   Physical Therapy Treatment Note     Name: Olegario Presley  Clinic Number: [id number]    Therapy Diagnosis:   Encounter Diagnoses   Name Primary?    Cervical radiculopathy Yes    Right arm weakness        Physician: Norma Swartz MD    Visit Date: 10/1/2024    Physician Orders: PT Eval and Treat  Medical Diagnosis from Referral: Cervical spondylosis [M47.812], Cervical radiculopathy [M54.12], Cervical radiculopathy due to degenerative joint disease of spine [M47.22]   Evaluation Date: 8/5/2024  Authorization Period Expiration: 12/31/2024  Plan of Care Expiration: 10/4/24  Progress Note Due: 9/5/24  Visit # / Visits authorized: 7/10 treatment +eval  FOTO: 1/3 (last performed 8/5/2024)     PRECAUTIONS: Standard      PTA Visit #: -/5     Time In: 5:30  Time Out: 6:30  Total Billable Time: 60 minutes    SUBJECTIVE     Pt reports: denies any severe pain in the last week. States she has intermittent pain to right cervical, however, pain has not lingered as long as it normally does     She was compliant with home exercise program.  Response to previous treatment: sore  Functional change: NA    Pain: -/10  Location: -    OBJECTIVE     Objective Measures updated at progress report unless specified.     9/10/24  Cervical flexion = DP pull to right cerivcal   Cervical extension = DN 60% limited   Cervical side bend = (B) DP 40% limited   Cervical rotation = ( R) DN 15% limited; (L) DP 15% limited     Treatment     Olegario received the treatments listed below:    MANUAL THERAPY to improve pain and ROM for (15) minutes including:     STM to B UT and cervical   Gentle cervical mobs   FDN to right upper trap       THERAPEUTIC EXERCISES to develop strength, endurance, ROM, flexibility, posture, and core stabilization for  (15) minutes including:     UBE (3'/3')  Matrix row (2 x 10 40#)       NEUROMUSCULAR RE-EDUCATION activities to improve: Balance, Coordination, Kinesthetic, Sense,  Proprioception, Posture, and stability for (15) minutes. The following activities were included:     Cable lateral pull downs (2 x 10 30#)    Serratus punches at wall (2 x 10)   Closed chain scapular movement at wall (2 x 10 ea)       THERAPEUTIC ACTIVITIES to improve functional performance for  (-) minutes, including:         GAIT TRAINING to improve functional mobility and safety for  (-) minutes, including:       Moist heat at end of session for  (10) minutes       Patient Education and Home Exercises     Home Exercises Provided and Patient Education Provided     Education provided:   - Continue HEP    Written Home Exercises Provided: Patient instructed to cont prior HEP. Exercises were reviewed and Olegario was able to demonstrate them prior to the end of the session.  Olegario demonstrated good  understanding of the education provided. See EMR under Patient Instructions for exercises provided during therapy sessions    See EMR under Patient Instructions for exercises provided     ASSESSMENT     Progressed exercises during today's session. Cues required for scapular depression and retraction during cable lat pull downs. Pt with mild soreness at end of session. Provided moist heat for pain reduction.     Olegario Is progressing well towards her goals.   Pt prognosis is Good.     Pt will continue to benefit from skilled outpatient physical therapy to address the deficits listed in the problem list box on initial evaluation, provide pt/family education and to maximize pt's level of independence in the home and community environment.     Pt's spiritual, cultural and educational needs considered and pt agreeable to plan of care and goals.     Anticipated barriers to physical therapy: none    GOALS:      Short Term Goals:  4 weeks Status  Date Met   PAIN: Pt will report worst pain of 5/10 in order to progress toward max functional ability and improve quality of life. [] Progressing  [x] Met  [] Not Met 9/10    FUNCTION:  Patient will improve functional outcome test score by 8 points  [x] Progressing  [] Met  [] Not Met     MOBILITY: Patient will improve cervical rotation AROM by 50 percent in order to progress towards independence with functional activities.  [] Progressing  [x] Met  [] Not Met 9/10     STRENGTH: Patient will improve strength by 1 grade  in order to progress towards independence with functional activities. [x] Progressing  [] Met  [] Not Met     POSTURE: Patient will apply proper body mechanics during sitting, standing, and light activities to decrease pain and promote long term stability.  [x] Progressing  [] Met  [] Not Met     HEP: Patient will demonstrate independence with HEP in order to progress toward functional independence. [] Progressing  [x] Met  [] Not Met 9/10        Long Term Goals:  8 weeks Status Date Met   PAIN: Pt will report worst pain of 1-2/10 in order to progress toward max functional ability and improve quality of life [x] Progressing  [] Met  [] Not Met     FUNCTION: Patient will demonstrate improved function as indicated by an increase of 12 points on FOTO [x] Progressing  [] Met  [] Not Met     MOBILITY: Patient will improve cervical AROM to WFL without pain in order to return to maximal functional potential and improve quality of life.  [x] Progressing  [] Met  [] Not Met     STRENGTH: Patient will improve strength to 4+/5 without pain in order to improve functional independence and quality of life. [x] Progressing  [] Met  [] Not Met     Patient will return to normal ADL's, IADL's, community involvement, recreational activities, and work-related activities with less than or equal to 1-2/10 pain and maximal function.  [x] Progressing  [] Met  [] Not Met           PLAN   Plan of care Certification: 8/5/2024 to 10/4/24     Outpatient Physical Therapy 2 times weekly for 8 weeks to include any combination of the following interventions: virtual visits, dry needling, modalities, electrical  stimulation (IFC, Pre-Mod, Attended with Functional Dry Needling), Electrical Stimulation  , Manual Therapy, Moist Heat/ Ice, Neuromuscular Re-ed, Patient Education, Self Care, Therapeutic Exercise, FDN, Functional Training, and Therapeutic Activites      Thank you for this referral.    Karen Quan, PT

## 2024-10-08 ENCOUNTER — CLINICAL SUPPORT (OUTPATIENT)
Dept: REHABILITATION | Facility: HOSPITAL | Age: 44
End: 2024-10-08
Payer: COMMERCIAL

## 2024-10-08 DIAGNOSIS — R29.898 RIGHT ARM WEAKNESS: ICD-10-CM

## 2024-10-08 DIAGNOSIS — M54.12 CERVICAL RADICULOPATHY: Primary | Chronic | ICD-10-CM

## 2024-10-08 PROCEDURE — 97110 THERAPEUTIC EXERCISES: CPT | Mod: PN

## 2024-10-08 PROCEDURE — 97140 MANUAL THERAPY 1/> REGIONS: CPT | Mod: PN

## 2024-10-08 PROCEDURE — 97112 NEUROMUSCULAR REEDUCATION: CPT | Mod: PN

## 2024-10-08 NOTE — PROGRESS NOTES
[facility]   Physical Therapy Treatment Note     Name: Olegario Presley  Clinic Number: [id number]    Therapy Diagnosis:   Encounter Diagnoses   Name Primary?    Cervical radiculopathy Yes    Right arm weakness      Physician: Norma Swartz MD    Visit Date: 10/8/2024    Physician Orders: PT Eval and Treat  Medical Diagnosis from Referral: Cervical spondylosis [M47.812], Cervical radiculopathy [M54.12], Cervical radiculopathy due to degenerative joint disease of spine [M47.22]   Evaluation Date: 8/5/2024  Authorization Period Expiration: 12/31/2024  Plan of Care Expiration: 10/4/24; extend 11/15/24  Progress Note Due: 11/8  Visit # / Visits authorized: 8/10 treatment +eval  FOTO: 2/3 (last performed 10/8/2024)     PRECAUTIONS: Standard      PTA Visit #: -/5     Time In: 5:30  Time Out: 6:30  Total Billable Time: 60 minutes    SUBJECTIVE     Pt reports: denies any severe pain in the last week. States she has intermittent pain to right cervical, however, pain has not lingered as long as it normally does     She was compliant with home exercise program.  Response to previous treatment: sore  Functional change: NA    Pain: -/10  Location: -    OBJECTIVE     10/8/24  Cervical flexion = Dysfunctional normal   Cervical extension = Dysfunctional normal 60% limited   Cervical side bend = (B) dysfunctional pain   Cervical rotation = (R) Dysfunctional Pain 60% limited; (L) Dysfunction pain 60% limited     Tenderness with palpation to right greater than left upper trap     FOTO = 54%    Treatment     Olegario received the treatments listed below:    MANUAL THERAPY to improve pain and ROM for (15) minutes including:     STM to B UT and cervical   Gentle cervical mobs   FDN to right upper trap       THERAPEUTIC EXERCISES to develop strength, endurance, ROM, flexibility, posture, and core stabilization for  (15) minutes including:     UBE (3'/3')  Matrix row (2 x 10 40#)       NEUROMUSCULAR RE-EDUCATION  activities to improve: Balance, Coordination, Kinesthetic, Sense, Proprioception, Posture, and stability for (15) minutes. The following activities were included:     Cable lateral pull downs (2 x 10 30#)    Serratus punches at wall (2 x 10)   Closed chain scapular movement at wall (2 x 10 ea)       THERAPEUTIC ACTIVITIES to improve functional performance for  (-) minutes, including:        GAIT TRAINING to improve functional mobility and safety for  (-) minutes, including:       Moist heat at end of session for  (1-0) minutes       Patient Education and Home Exercises     Home Exercises Provided and Patient Education Provided     Education provided:   - Continue HEP    Written Home Exercises Provided: Patient instructed to cont prior HEP. Exercises were reviewed and Olegario was able to demonstrate them prior to the end of the session.  Olegario demonstrated good  understanding of the education provided. See EMR under Patient Instructions for exercises provided during therapy sessions    See EMR under Patient Instructions for exercises provided     ASSESSMENT     Pt presents to clinic with recent exacerbation of pain to right lower cervical region. Severe tension and pain noted with palpation. Re-assessed by objective measures during today's session. Pt demonstrates improved cervical ROM and strength. Pt is limited by pain with cervical rotation, Myofascia trigger point, and moderate irritability to area. Pt will continue to benefit from skilled physical therapy to further address above deficits and decrease risk of recurrence.      Olegario Is progressing well towards her goals.   Pt prognosis is Good.     Pt will continue to benefit from skilled outpatient physical therapy to address the deficits listed in the problem list box on initial evaluation, provide pt/family education and to maximize pt's level of independence in the home and community environment.     Pt's spiritual, cultural and educational needs considered and  pt agreeable to plan of care and goals.     Anticipated barriers to physical therapy: none    GOALS:      Short Term Goals:  4 weeks Status  Date Met   PAIN: Pt will report worst pain of 5/10 in order to progress toward max functional ability and improve quality of life. [] Progressing  [x] Met  [] Not Met 9/10    FUNCTION: Patient will improve functional outcome test score by 8 points  [x] Progressing  [x] Met  [] Not Met 10/8    MOBILITY: Patient will improve cervical rotation AROM by 50 percent in order to progress towards independence with functional activities.  [] Progressing  [x] Met  [] Not Met 9/10     STRENGTH: Patient will improve strength by 1 grade  in order to progress towards independence with functional activities. [x] Progressing  [] Met  [] Not Met     POSTURE: Patient will apply proper body mechanics during sitting, standing, and light activities to decrease pain and promote long term stability.  [] Progressing  [x] Met  [] Not Met  10/8   HEP: Patient will demonstrate independence with HEP in order to progress toward functional independence. [] Progressing  [x] Met  [] Not Met 9/10        Long Term Goals:  8 weeks Status Date Met   PAIN: Pt will report worst pain of 1-2/10 in order to progress toward max functional ability and improve quality of life [x] Progressing  [] Met  [] Not Met     FUNCTION: Patient will demonstrate improved function as indicated by an increase of 12 points on FOTO [x] Progressing  [] Met  [] Not Met     MOBILITY: Patient will improve cervical AROM to WFL without pain in order to return to maximal functional potential and improve quality of life.  [x] Progressing  [] Met  [] Not Met     STRENGTH: Patient will improve strength to 4+/5 without pain in order to improve functional independence and quality of life. [x] Progressing  [] Met  [] Not Met     Patient will return to normal ADL's, IADL's, community involvement, recreational activities, and work-related activities  with less than or equal to 1-2/10 pain and maximal function.  [x] Progressing  [] Met  [] Not Met           PLAN   Plan of care Certification: extend 11/15/24     Outpatient Physical Therapy 2 times weekly for 6 weeks to include any combination of the following interventions: virtual visits, dry needling, modalities, electrical stimulation (IFC, Pre-Mod, Attended with Functional Dry Needling), Electrical Stimulation  , Manual Therapy, Moist Heat/ Ice, Neuromuscular Re-ed, Patient Education, Self Care, Therapeutic Exercise, FDN, Functional Training, and Therapeutic Activites      Thank you for this referral.    Karen Quan, PT

## 2024-10-09 NOTE — PLAN OF CARE
[facility]   Physical Therapy Updated plan of Care/Treatment Note     Name: Olegario Presley  Clinic Number: [id number]    Therapy Diagnosis:   Encounter Diagnoses   Name Primary?    Cervical radiculopathy Yes    Right arm weakness      Physician: Norma Swartz MD    Visit Date: 10/8/2024    Physician Orders: PT Eval and Treat  Medical Diagnosis from Referral: Cervical spondylosis [M47.812], Cervical radiculopathy [M54.12], Cervical radiculopathy due to degenerative joint disease of spine [M47.22]   Evaluation Date: 8/5/2024  Authorization Period Expiration: 12/31/2024  Plan of Care Expiration: 10/4/24; extend 11/15/24  Progress Note Due: 11/8/24  Visit # / Visits authorized: 8/10 treatment +eval  FOTO: 2/3 (last performed 10/8/2024)     PRECAUTIONS: Standard      PTA Visit #: -/5     Time In: 5:30  Time Out: 6:30  Total Billable Time: 60 minutes    SUBJECTIVE     Pt reports: denies any severe pain in the last week. States she has intermittent pain to right cervical, however, pain has not lingered as long as it normally does     She was compliant with home exercise program.  Response to previous treatment: sore  Functional change: NA    Pain: -/10  Location: -    OBJECTIVE     10/8/24  Cervical flexion = Dysfunctional normal   Cervical extension = Dysfunctional normal 60% limited   Cervical side bend = (B) dysfunctional pain   Cervical rotation = (R) Dysfunctional Pain 60% limited; (L) Dysfunction pain 60% limited     Tenderness with palpation to right greater than left upper trap     FOTO = 54%    Treatment     Olegario received the treatments listed below:    MANUAL THERAPY to improve pain and ROM for (15) minutes including:     STM to B UT and cervical   Gentle cervical mobs   FDN to right upper trap       THERAPEUTIC EXERCISES to develop strength, endurance, ROM, flexibility, posture, and core stabilization for  (15) minutes including:     UBE (3'/3')  Matrix row (2 x 10 40#)        NEUROMUSCULAR RE-EDUCATION activities to improve: Balance, Coordination, Kinesthetic, Sense, Proprioception, Posture, and stability for (15) minutes. The following activities were included:     Cable lateral pull downs (2 x 10 30#)    Serratus punches at wall (2 x 10)   Closed chain scapular movement at wall (2 x 10 ea)       THERAPEUTIC ACTIVITIES to improve functional performance for  (-) minutes, including:        GAIT TRAINING to improve functional mobility and safety for  (-) minutes, including:       Moist heat at end of session for  (1-0) minutes       Patient Education and Home Exercises     Home Exercises Provided and Patient Education Provided     Education provided:   - Continue HEP    Written Home Exercises Provided: Patient instructed to cont prior HEP. Exercises were reviewed and Olegario was able to demonstrate them prior to the end of the session.  Olegario demonstrated good  understanding of the education provided. See EMR under Patient Instructions for exercises provided during therapy sessions    See EMR under Patient Instructions for exercises provided     ASSESSMENT     Pt presents to clinic with recent exacerbation of pain to right lower cervical region. Severe tension and pain noted with palpation. Re-assessed by objective measures during today's session. Pt demonstrates improved cervical ROM and strength. Pt is limited by pain with cervical rotation, Myofascia trigger point, and moderate irritability to area. Pt will continue to benefit from skilled physical therapy to further address above deficits and decrease risk of recurrence.      Olegario Is progressing well towards her goals.   Pt prognosis is Good.     Pt will continue to benefit from skilled outpatient physical therapy to address the deficits listed in the problem list box on initial evaluation, provide pt/family education and to maximize pt's level of independence in the home and community environment.     Pt's spiritual, cultural and  educational needs considered and pt agreeable to plan of care and goals.     Anticipated barriers to physical therapy: none    GOALS:      Short Term Goals:  4 weeks Status  Date Met   PAIN: Pt will report worst pain of 5/10 in order to progress toward max functional ability and improve quality of life. [] Progressing  [x] Met  [] Not Met 9/10    FUNCTION: Patient will improve functional outcome test score by 8 points  [x] Progressing  [x] Met  [] Not Met 10/8    MOBILITY: Patient will improve cervical rotation AROM by 50 percent in order to progress towards independence with functional activities.  [] Progressing  [x] Met  [] Not Met 9/10     STRENGTH: Patient will improve strength by 1 grade  in order to progress towards independence with functional activities. [x] Progressing  [] Met  [] Not Met     POSTURE: Patient will apply proper body mechanics during sitting, standing, and light activities to decrease pain and promote long term stability.  [] Progressing  [x] Met  [] Not Met  10/8   HEP: Patient will demonstrate independence with HEP in order to progress toward functional independence. [] Progressing  [x] Met  [] Not Met 9/10        Long Term Goals:  8 weeks Status Date Met   PAIN: Pt will report worst pain of 1-2/10 in order to progress toward max functional ability and improve quality of life [x] Progressing  [] Met  [] Not Met     FUNCTION: Patient will demonstrate improved function as indicated by an increase of 12 points on FOTO [x] Progressing  [] Met  [] Not Met     MOBILITY: Patient will improve cervical AROM to WFL without pain in order to return to maximal functional potential and improve quality of life.  [x] Progressing  [] Met  [] Not Met     STRENGTH: Patient will improve strength to 4+/5 without pain in order to improve functional independence and quality of life. [x] Progressing  [] Met  [] Not Met     Patient will return to normal ADL's, IADL's, community involvement, recreational  activities, and work-related activities with less than or equal to 1-2/10 pain and maximal function.  [x] Progressing  [] Met  [] Not Met        PLAN     Updated Certification Period: extend 11/15/24  Recommended Treatment Plan: 2 times per week for 6 weeks:  virtual visits, dry needling, modalities, electrical stimulation (IFC, Pre-Mod, Attended with Functional Dry Needling), Electrical Stimulation  , Manual Therapy, Moist Heat/ Ice, Neuromuscular Re-ed, Patient Education, Self Care, Therapeutic Exercise, FDN, Functional Training, and Therapeutic Activites     Karen Quan, PT    I CERTIFY THE NEED FOR THESE SERVICES FURNISHED UNDER THIS PLAN OF TREATMENT AND WHILE UNDER MY CARE  Physician's comments:      Physician's Signature: ___________________________________________________

## 2024-10-10 ENCOUNTER — CLINICAL SUPPORT (OUTPATIENT)
Dept: REHABILITATION | Facility: HOSPITAL | Age: 44
End: 2024-10-10
Payer: COMMERCIAL

## 2024-10-10 DIAGNOSIS — M54.12 CERVICAL RADICULOPATHY: Primary | Chronic | ICD-10-CM

## 2024-10-10 DIAGNOSIS — R29.898 RIGHT ARM WEAKNESS: ICD-10-CM

## 2024-10-10 PROCEDURE — 97110 THERAPEUTIC EXERCISES: CPT | Mod: PN

## 2024-10-10 PROCEDURE — 97112 NEUROMUSCULAR REEDUCATION: CPT | Mod: PN

## 2024-10-10 PROCEDURE — 97140 MANUAL THERAPY 1/> REGIONS: CPT | Mod: PN

## 2024-10-12 NOTE — PROGRESS NOTES
[facility]   Physical Therapy Treatment Note     Name: Olegario Presley  Clinic Number: [id number]    Therapy Diagnosis:   Encounter Diagnoses   Name Primary?    Cervical radiculopathy Yes    Right arm weakness      Physician: Norma Swartz MD    Visit Date: 10/10/2024    Physician Orders: PT Eval and Treat  Medical Diagnosis from Referral: Cervical spondylosis [M47.812], Cervical radiculopathy [M54.12], Cervical radiculopathy due to degenerative joint disease of spine [M47.22]   Evaluation Date: 8/5/2024  Authorization Period Expiration: 12/31/2024  Plan of Care Expiration: 10/4/24; extend 11/15/24  Progress Note Due: 11/8  Visit # / Visits authorized: 9/30 treatment +eval  FOTO: 2/3 (last performed 10/8/2024)     PRECAUTIONS: Standard      PTA Visit #: -/5     Time In: 5:30  Time Out: 6:30  Total Billable Time: 60 minutes    SUBJECTIVE     Pt reports: improved neck pain following last session, however   She was compliant with home exercise program.  Response to previous treatment: sore  Functional change: NA    Pain: -/10  Location: -    OBJECTIVE     10/8/24  Cervical flexion = Dysfunctional normal   Cervical extension = Dysfunctional normal 60% limited   Cervical side bend = (B) dysfunctional pain   Cervical rotation = (R) Dysfunctional Pain 60% limited; (L) Dysfunction pain 60% limited     Tenderness with palpation to right greater than left upper trap     FOTO = 54%    Treatment     Olegario received the treatments listed below:      MANUAL THERAPY to improve pain and ROM for (10) minutes including:     STM to B UT and cervical   Gentle cervical mobs   FDN to right upper trap       THERAPEUTIC EXERCISES to develop strength, endurance, ROM, flexibility, posture, and core stabilization for  (10) minutes including:     UBE (3'/3')  Matrix row (2 x 10 40#)       NEUROMUSCULAR RE-EDUCATION activities to improve: Balance, Coordination, Kinesthetic, Sense, Proprioception, Posture, and  "stability for (27) minutes. The following activities were included:     Cable lateral pull downs (2 x 10 30#)    Serratus punches at wall (2 x 10)   Closed chain scapular movement at wall (2 x 10 ea)   Palloff press (10 x 10" double GTC)   Reverse TYI's on swiss ball with c ret (2 x 10)       THERAPEUTIC ACTIVITIES to improve functional performance for  (3) minutes, including:     Farmer's carry (1 lap 15#)       GAIT TRAINING to improve functional mobility and safety for  (-) minutes, including:       Moist heat at end of session for  (10) minutes       Patient Education and Home Exercises     Home Exercises Provided and Patient Education Provided     Education provided:   - Continue HEP    Written Home Exercises Provided: Patient instructed to cont prior HEP. Exercises were reviewed and Olegario was able to demonstrate them prior to the end of the session.  Olegario demonstrated good  understanding of the education provided. See EMR under Patient Instructions for exercises provided during therapy sessions    See EMR under Patient Instructions for exercises provided     ASSESSMENT     Pt presents to clinic with improved pain level, however, continue to have severe tension noted to right upper trap. Progressed exercises to improve postural strengthening and functional activities such as carrying. Continue to progress as tolerated.     Olegario Is progressing well towards her goals.   Pt prognosis is Good.     Pt will continue to benefit from skilled outpatient physical therapy to address the deficits listed in the problem list box on initial evaluation, provide pt/family education and to maximize pt's level of independence in the home and community environment.     Pt's spiritual, cultural and educational needs considered and pt agreeable to plan of care and goals.     Anticipated barriers to physical therapy: none    GOALS:      Short Term Goals:  4 weeks Status  Date Met   PAIN: Pt will report worst pain of 5/10 in order " to progress toward max functional ability and improve quality of life. [] Progressing  [x] Met  [] Not Met 9/10    FUNCTION: Patient will improve functional outcome test score by 8 points  [x] Progressing  [x] Met  [] Not Met 10/8    MOBILITY: Patient will improve cervical rotation AROM by 50 percent in order to progress towards independence with functional activities.  [] Progressing  [x] Met  [] Not Met 9/10     STRENGTH: Patient will improve strength by 1 grade  in order to progress towards independence with functional activities. [x] Progressing  [] Met  [] Not Met     POSTURE: Patient will apply proper body mechanics during sitting, standing, and light activities to decrease pain and promote long term stability.  [] Progressing  [x] Met  [] Not Met  10/8   HEP: Patient will demonstrate independence with HEP in order to progress toward functional independence. [] Progressing  [x] Met  [] Not Met 9/10        Long Term Goals:  8 weeks Status Date Met   PAIN: Pt will report worst pain of 1-2/10 in order to progress toward max functional ability and improve quality of life [x] Progressing  [] Met  [] Not Met     FUNCTION: Patient will demonstrate improved function as indicated by an increase of 12 points on FOTO [x] Progressing  [] Met  [] Not Met     MOBILITY: Patient will improve cervical AROM to WFL without pain in order to return to maximal functional potential and improve quality of life.  [x] Progressing  [] Met  [] Not Met     STRENGTH: Patient will improve strength to 4+/5 without pain in order to improve functional independence and quality of life. [x] Progressing  [] Met  [] Not Met     Patient will return to normal ADL's, IADL's, community involvement, recreational activities, and work-related activities with less than or equal to 1-2/10 pain and maximal function.  [x] Progressing  [] Met  [] Not Met           PLAN   Plan of care Certification: extend 11/15/24     Outpatient Physical Therapy 2 times  weekly for 6 weeks to include any combination of the following interventions: virtual visits, dry needling, modalities, electrical stimulation (IFC, Pre-Mod, Attended with Functional Dry Needling), Electrical Stimulation  , Manual Therapy, Moist Heat/ Ice, Neuromuscular Re-ed, Patient Education, Self Care, Therapeutic Exercise, FDN, Functional Training, and Therapeutic Activites      Thank you for this referral.    Karen Quan, PT

## 2024-10-17 ENCOUNTER — CLINICAL SUPPORT (OUTPATIENT)
Dept: REHABILITATION | Facility: HOSPITAL | Age: 44
End: 2024-10-17
Payer: COMMERCIAL

## 2024-10-17 DIAGNOSIS — R29.898 RIGHT ARM WEAKNESS: ICD-10-CM

## 2024-10-17 DIAGNOSIS — M54.12 CERVICAL RADICULOPATHY: Primary | Chronic | ICD-10-CM

## 2024-10-17 PROCEDURE — 97112 NEUROMUSCULAR REEDUCATION: CPT | Mod: PN

## 2024-10-17 PROCEDURE — 97140 MANUAL THERAPY 1/> REGIONS: CPT | Mod: PN

## 2024-10-17 PROCEDURE — 97110 THERAPEUTIC EXERCISES: CPT | Mod: PN

## 2024-10-21 NOTE — PROGRESS NOTES
[facility]   Physical Therapy Treatment Note     Name: Olegario Presley  Clinic Number: [id number]    Therapy Diagnosis:   Encounter Diagnoses   Name Primary?    Cervical radiculopathy Yes    Right arm weakness      Physician: Norma Swartz MD    Visit Date: 10/17/2024    Physician Orders: PT Eval and Treat  Medical Diagnosis from Referral: Cervical spondylosis [M47.812], Cervical radiculopathy [M54.12], Cervical radiculopathy due to degenerative joint disease of spine [M47.22]   Evaluation Date: 8/5/2024  Authorization Period Expiration: 12/31/2024  Plan of Care Expiration: 10/4/24; extend 11/15/24  Progress Note Due: 11/8  Visit # / Visits authorized: 10/30 treatment +eval  FOTO: 2/3 (last performed 10/8/2024)     PRECAUTIONS: Standard      PTA Visit #: -/5     Time In: 5:30  Time Out: 6:30  Total Billable Time: 60 minutes    SUBJECTIVE     Pt reports: increased pain to right side her neck due to being out of town and sleeping with a different pillow     She was compliant with home exercise program.  Response to previous treatment: sore  Functional change: NA    Pain: -/10  Location: -    OBJECTIVE     10/8/24  Cervical flexion = Dysfunctional normal   Cervical extension = Dysfunctional normal 60% limited   Cervical side bend = (B) dysfunctional pain   Cervical rotation = (R) Dysfunctional Pain 60% limited; (L) Dysfunction pain 60% limited     Tenderness with palpation to right greater than left upper trap     FOTO = 54%    Treatment     Olegario received the treatments listed below:      MANUAL THERAPY to improve pain and ROM for (15) minutes including:     STM to B UT and cervical   Gentle cervical mobs   FDN to right upper trap  and levator       THERAPEUTIC EXERCISES to develop strength, endurance, ROM, flexibility, posture, and core stabilization for  (10) minutes including:     UBE (3'/3')  Matrix row (2 x 10 40#)       NEUROMUSCULAR RE-EDUCATION activities to improve: Balance,  "Coordination, Kinesthetic, Sense, Proprioception, Posture, and stability for (15) minutes. The following activities were included:     4 way Cervical isometrics (2 x 10)   Cable lateral pull downs (2 x 10 30#) - NT    Supine serratus punches at wall (2 x 10) - NT  Palloff press (10 x 10" double GTC) - NT   TYI's at doorway (2 x 10)       THERAPEUTIC ACTIVITIES to improve functional performance for  (-) minutes, including:     Farmer's carry (1 lap 15#) - NT      GAIT TRAINING to improve functional mobility and safety for  (-) minutes, including:       Moist heat at end of session for  (10) minutes       Patient Education and Home Exercises     Home Exercises Provided and Patient Education Provided     Education provided:   - Continue HEP    Written Home Exercises Provided: Patient instructed to cont prior HEP. Exercises were reviewed and Olegario was able to demonstrate them prior to the end of the session.  Olegario demonstrated good  understanding of the education provided. See EMR under Patient Instructions for exercises provided during therapy sessions    See EMR under Patient Instructions for exercises provided     ASSESSMENT     Pt with increased irritability to right cervical during today's session. Performed dry needling to upper trap for tension reduction. During exercises severe increased tension and pain to right levator. Performed dry needling to levator with immediate relief. Continue to progress as tolerated.     Olegario Is progressing well towards her goals.   Pt prognosis is Good.     Pt will continue to benefit from skilled outpatient physical therapy to address the deficits listed in the problem list box on initial evaluation, provide pt/family education and to maximize pt's level of independence in the home and community environment.     Pt's spiritual, cultural and educational needs considered and pt agreeable to plan of care and goals.     Anticipated barriers to physical therapy: none    GOALS:    "   Short Term Goals:  4 weeks Status  Date Met   PAIN: Pt will report worst pain of 5/10 in order to progress toward max functional ability and improve quality of life. [] Progressing  [x] Met  [] Not Met 9/10    FUNCTION: Patient will improve functional outcome test score by 8 points  [x] Progressing  [x] Met  [] Not Met 10/8    MOBILITY: Patient will improve cervical rotation AROM by 50 percent in order to progress towards independence with functional activities.  [] Progressing  [x] Met  [] Not Met 9/10     STRENGTH: Patient will improve strength by 1 grade  in order to progress towards independence with functional activities. [x] Progressing  [] Met  [] Not Met     POSTURE: Patient will apply proper body mechanics during sitting, standing, and light activities to decrease pain and promote long term stability.  [] Progressing  [x] Met  [] Not Met  10/8   HEP: Patient will demonstrate independence with HEP in order to progress toward functional independence. [] Progressing  [x] Met  [] Not Met 9/10        Long Term Goals:  8 weeks Status Date Met   PAIN: Pt will report worst pain of 1-2/10 in order to progress toward max functional ability and improve quality of life [x] Progressing  [] Met  [] Not Met     FUNCTION: Patient will demonstrate improved function as indicated by an increase of 12 points on FOTO [x] Progressing  [] Met  [] Not Met     MOBILITY: Patient will improve cervical AROM to WFL without pain in order to return to maximal functional potential and improve quality of life.  [x] Progressing  [] Met  [] Not Met     STRENGTH: Patient will improve strength to 4+/5 without pain in order to improve functional independence and quality of life. [x] Progressing  [] Met  [] Not Met     Patient will return to normal ADL's, IADL's, community involvement, recreational activities, and work-related activities with less than or equal to 1-2/10 pain and maximal function.  [x] Progressing  [] Met  [] Not Met            PLAN   Plan of care Certification: extend 11/15/24     Outpatient Physical Therapy 2 times weekly for 6 weeks to include any combination of the following interventions: virtual visits, dry needling, modalities, electrical stimulation (IFC, Pre-Mod, Attended with Functional Dry Needling), Electrical Stimulation  , Manual Therapy, Moist Heat/ Ice, Neuromuscular Re-ed, Patient Education, Self Care, Therapeutic Exercise, FDN, Functional Training, and Therapeutic Activites      Thank you for this referral.    Karen Quan, PT

## 2024-10-24 ENCOUNTER — CLINICAL SUPPORT (OUTPATIENT)
Dept: REHABILITATION | Facility: HOSPITAL | Age: 44
End: 2024-10-24
Payer: COMMERCIAL

## 2024-10-24 DIAGNOSIS — M54.12 CERVICAL RADICULOPATHY: Primary | Chronic | ICD-10-CM

## 2024-10-24 DIAGNOSIS — R29.898 RIGHT ARM WEAKNESS: ICD-10-CM

## 2024-10-24 PROCEDURE — 97140 MANUAL THERAPY 1/> REGIONS: CPT | Mod: PN

## 2024-10-24 PROCEDURE — 97112 NEUROMUSCULAR REEDUCATION: CPT | Mod: PN

## 2024-10-24 PROCEDURE — 97530 THERAPEUTIC ACTIVITIES: CPT | Mod: PN

## 2024-10-24 PROCEDURE — 97110 THERAPEUTIC EXERCISES: CPT | Mod: PN

## 2024-10-24 NOTE — PROGRESS NOTES
[facility]   Physical Therapy Treatment Note     Name: Olegario Presley  Clinic Number: [id number]    Therapy Diagnosis:   Encounter Diagnoses   Name Primary?    Cervical radiculopathy Yes    Right arm weakness      Physician: Norma Swartz MD    Visit Date: 10/24/2024    Physician Orders: PT Eval and Treat  Medical Diagnosis from Referral: Cervical spondylosis [M47.812], Cervical radiculopathy [M54.12], Cervical radiculopathy due to degenerative joint disease of spine [M47.22]   Evaluation Date: 8/5/2024  Authorization Period Expiration: 12/31/2024  Plan of Care Expiration: 10/4/24; extend 11/15/24  Progress Note Due: 11/8  Visit # / Visits authorized: 11/30 treatment +eval  FOTO: 2/3 (last performed 10/8/2024)     PRECAUTIONS: Standard       PTA Visit #: -/5      Time In: 5:25 pm   Time Out: 6:30 pm   Total Billable Time: 55 minutes + 10 minute hot pad     SUBJECTIVE      Pt reports: decreased pain following last session. States she had a follow up with with her PCP who recommended following up with Dr. Swartz about her neck pain.      She was compliant with home exercise program.  Response to previous treatment: improved pain   Functional change: improving ROM      Pain: 3/10  Location: right cervical      OBJECTIVE      Objective Measures updated at progress report unless specified.       Treatment      Olegario received the treatments listed below:      MANUAL THERAPY to improve pain and ROM for (15) minutes including:     STM to B UT and cervical   FDN to right upper trap  and levator   Mobilization with movement - seated L lateral flexion and supine cervical rotation       THERAPEUTIC EXERCISES to develop strength, endurance, ROM, flexibility, posture, and core stabilization for  (10) minutes including:     UBE (3'/3')  Matrix row (3 x 10 40#)       NEUROMUSCULAR RE-EDUCATION activities to improve: Balance, Coordination, Kinesthetic, Sense, Proprioception, Posture, and stability for  "(15) minutes. The following activities were included:     prone TYI's (x10 ea)   Vince Shld ext (2 x 10 GTB)   Serratus punches at wall (2 x 10)   Palloff press (10 x 10" double GTC)       THERAPEUTIC ACTIVITIES to improve functional performance for  (-) minutes, including:     Farmer's carry (1 lap 20#)   's carry (1 lap 5#)      GAIT TRAINING to improve functional mobility and safety for  (-) minutes, including:       Moist heat at end of session for  (10) minutes         Patient Education and Home Exercises      Home Exercises Provided and Patient Education Provided      Education provided:   - Continue HEP     Written Home Exercises Provided: Patient instructed to cont prior HEP. Exercises were reviewed and Olegario was able to demonstrate them prior to the end of the session. Olegario demonstrated good understanding of the education provided. See EMR under Patient Instructions for exercises provided during therapy sessions     See EMR under Patient Instructions for exercises provided      ASSESSMENT      Pt presents to clinic with decreased tension to right upper trap region. Performed dry needling for further tension reduction. Performed mobilization with movement to improve cervical rotation.  Pt demonstrates improve right cervical rotation following manual. Pt with good tolerance to progression of exercises.      Olegario Is progressing well towards her goals.   Pt prognosis is Good.    Pt will continue to benefit from skilled outpatient physical therapy to address the deficits listed in the problem list box on initial evaluation, provide pt/family education and to maximize pt's level of independence in the home and community environment.     Pt's spiritual, cultural and educational needs considered and pt agreeable to plan of care and goals.     Anticipated barriers to physical therapy: none    GOALS:      Short Term Goals:  4 weeks Status  Date Met   PAIN: Pt will report worst pain of 5/10 in order to progress " toward max functional ability and improve quality of life. [] Progressing  [x] Met  [] Not Met 9/10    FUNCTION: Patient will improve functional outcome test score by 8 points  [x] Progressing  [x] Met  [] Not Met 10/8    MOBILITY: Patient will improve cervical rotation AROM by 50 percent in order to progress towards independence with functional activities.  [] Progressing  [x] Met  [] Not Met 9/10     STRENGTH: Patient will improve strength by 1 grade  in order to progress towards independence with functional activities. [x] Progressing  [] Met  [] Not Met     POSTURE: Patient will apply proper body mechanics during sitting, standing, and light activities to decrease pain and promote long term stability.  [] Progressing  [x] Met  [] Not Met  10/8   HEP: Patient will demonstrate independence with HEP in order to progress toward functional independence. [] Progressing  [x] Met  [] Not Met 9/10        Long Term Goals:  8 weeks Status Date Met   PAIN: Pt will report worst pain of 1-2/10 in order to progress toward max functional ability and improve quality of life [x] Progressing  [] Met  [] Not Met     FUNCTION: Patient will demonstrate improved function as indicated by an increase of 12 points on FOTO [x] Progressing  [] Met  [] Not Met     MOBILITY: Patient will improve cervical AROM to WFL without pain in order to return to maximal functional potential and improve quality of life.  [x] Progressing  [] Met  [] Not Met     STRENGTH: Patient will improve strength to 4+/5 without pain in order to improve functional independence and quality of life. [x] Progressing  [] Met  [] Not Met     Patient will return to normal ADL's, IADL's, community involvement, recreational activities, and work-related activities with less than or equal to 1-2/10 pain and maximal function.  [x] Progressing  [] Met  [] Not Met           PLAN   Plan of care Certification: extend 11/15/24     Outpatient Physical Therapy 2 times weekly for 6  weeks to include any combination of the following interventions: virtual visits, dry needling, modalities, electrical stimulation (IFC, Pre-Mod, Attended with Functional Dry Needling), Electrical Stimulation  , Manual Therapy, Moist Heat/ Ice, Neuromuscular Re-ed, Patient Education, Self Care, Therapeutic Exercise, FDN, Functional Training, and Therapeutic Activites      Thank you for this referral.    Karen Quan, PT

## 2024-10-29 ENCOUNTER — PATIENT MESSAGE (OUTPATIENT)
Dept: PAIN MEDICINE | Facility: CLINIC | Age: 44
End: 2024-10-29
Payer: COMMERCIAL

## 2024-10-29 NOTE — H&P (VIEW-ONLY)
Established Patient - TeleHealth Visit    The patient location is: [place]  The chief complaint leading to consultation is: chronic pain     Visit type: audiovisual    Face to Face time with patient: 10-15 minutes  20 minutes of total time spent on the encounter, which includes face to face time and non-face to face time preparing to see the patient (eg, review of tests), Obtaining and/or reviewing separately obtained history, Documenting clinical information in the electronic or other health record, Independently interpreting results (not separately reported) and communicating results to the patient/family/caregiver, or Care coordination (not separately reported).     Each patient to whom he or she provides medical services by telemedicine is:  (1) informed of the relationship between the physician and patient and the respective role of any other health care provider with respect to management of the patient; and (2) notified that he or she may decline to receive medical services by telemedicine and may withdraw from such care at any time.      Chronic Pain Note     Referring Physician: No ref. provider found    PCP: Trista Key MD    Chief Complaint:   No chief complaint on file.       SUBJECTIVE:  Interval History (10/30/2024):  Patient Olegario Presley presents today for follow-up visit.  Patient is being seen for follow up of neck pain. She rates her pain 5/10 today. Pain is remaining axial in the neck without radiculopathy and is worse with rotation of the neck. She has been in PT for 8 weeks and at times gets relief but other times the neck and traps feel tight. She has had some relief with dry needling when the tightness in the muscles happens.   Patient denies any other complaints or concerns at this time.    Interval history 8/1/2024  Olegario Presley is a 44 y.o. female with past medical history significant for generalized anxiety disorder, hyperlipidemia, hypertension, obesity, insulin resistance, GERD,  sleep apnea who presents to the clinic for the evaluation of neck, head, arm and left knee pain.  Patient reports neck pain has been present since June without inciting accident injury or trauma.  Pain is intermittent and today is rated a 2/10.  Pain is described as sharp and stabbing in nature.  Patient reports pain in bilateral cervical paraspinous musculature which can radiate superiorly and greater and lesser occipital nerve distributions.  She does report generalized fatigue in the upper extremities and numbness and tingling in the fingertips but denies outright cervical radiculopathy.  Pain in the neck and arms can be exacerbated with cervical flexion/extension lateral rotation as well as use of the upper extremities.  Pain can be improved with heat, sitting still and recent prescription of gabapentin from her PCP, Dr. Slater.  Patient is currently taking gabapentin 300 mg twice daily.  She denies any side effects from this medication.  Patient has been performing physician directed physical therapy exercises for neck and arm pain over the last 8 weeks from 06/01/2024 through 08/01/2024 with marginal improvement in her symptoms.  Patient has not received prior interventional treatment.    She also reports new onset left knee pain.  This pain has been present for 1 day without inciting accident injury or trauma.  Patient reports pain along the suprapatellar aspect of the left knee, which is worse with standing and ambulation.  She denies significant lower extremity weakness.  She has never received interventional treatment for knee pain.    Patient denies night fever/night sweats, urinary incontinence, bowel incontinence, significant weight loss, and significant motor weakness.  Patient reports loss of sensations.      Pain Disability Index Review:         8/1/2024     8:07 AM   Last 3 PDI Scores   Pain Disability Index (PDI) 12       Non-Pharmacologic Treatments:  Physical Therapy/Home Exercise:  yes  Ice/Heat:yes  TENS: no  Acupuncture: no  Massage: no  Chiropractic: no    Other: no      Pain Medications:  - Adjuvant Medications: Cyclobenzaprine (Flexeril) and Neurontin (Gabapentin)    Pain Procedures:   None    Past Medical History:   Diagnosis Date    Allergy     Deviated septum     Encounter for general adult medical examination without abnormal findings 09/22/2014    GERD (gastroesophageal reflux disease)     Hyperkalemia     Sleep apnea      Past Surgical History:   Procedure Laterality Date    ESOPHAGOGASTRODUODENOSCOPY N/A 5/5/2022    Procedure: EGD (ESOPHAGOGASTRODUODENOSCOPY);  Surgeon: Shreyas Newsome MD;  Location: [facility];  Service: Gastroenterology;  Laterality: N/A;    PLANTAR FASCIA SURGERY      SINUS SURGERY       Review of patient's allergies indicates:  No Known Allergies    Current Outpatient Medications   Medication Sig    busPIRone (BUSPAR) 7.5 MG tablet Take 1 tablet (7.5 mg total) by mouth 2 (two) times a day.    cyclobenzaprine (FLEXERIL) 10 MG tablet Take 1 tablet (10 mg total) by mouth nightly as needed for Muscle spasms. One tab poqhs    empagliflozin (JARDIANCE) 10 mg tablet Take 1 tablet (10 mg total) by mouth every morning.    gabapentin (NEURONTIN) 300 MG capsule Take 1 capsule (300 mg total) by mouth 2 (two) times daily.    levonorgestrel (MIRENA) 20 mcg/24 hours (5 yrs) 52 mg IUD 1 each by Intrauterine route once.    metFORMIN (GLUCOPHAGE-XR) 500 MG ER 24hr tablet 4 tabs poqd    metoprolol succinate (TOPROL XL) 50 MG 24 hr tablet Take 1 tablet (50 mg total) by mouth every evening.     No current facility-administered medications for this visit.         ROS:  GENERAL:  No weight loss, malaise or fevers.  HEENT:   No recent changes in vision or hearing  NECK:  Negative for lumps, no difficulty with swallowing.  RESPIRATORY:  Negative for cough, wheezing or shortness of breath, patient denies any recent URI.  CARDIOVASCULAR:  Negative for chest pain or palpitations.  GI:   Negative for abdominal discomfort, blood in stools or black stools or change in bowel habits.  MUSCULOSKELETAL:  See HPI.  SKIN:  Negative for lesions, rash, and itching.  PSYCH:  No mood disorder or recent psychosocial stressors.   HEMATOLOGY/LYMPHOLOGY:  Negative for prolonged bleeding, bruising easily or swollen nodes.    NEURO:   No history of syncope, paralysis, seizures or tremors.  All other reviewed and negative other than HPI.      Telemedicine Exam  There were no vitals filed for this visit.  There is no height or weight on file to calculate BMI.      Physical Exam: last in clinic visit:    OBJECTIVE:    There were no vitals taken for this visit.      Physical Exam:    GENERAL: Well appearing, in no acute distress, alert and oriented x3.  PSYCH:  Mood and affect appropriate.  SKIN: Skin color, texture, turgor normal, no rashes or lesions.  HEAD/FACE:  Normocephalic, atraumatic. Cranial nerves grossly intact.    NECK:  pain to palpation over the cervical paraspinous muscles. Spurling Negative.  pain with neck flexion, extension, or lateral flexion. Full ROM  Normaltesting biceps, triceps and brachioradialis bilaterally.    NegativeHoffmann's bilaterally.    5/5 strength testing deltoid, biceps, triceps, wrist extensor, wrist flexor and ulnar intrinsics bilaterally.    Normal  strength bilaterally    CV: RRR with palpation of the radial artery.  PULM: No evidence of respiratory difficulty, symmetric chest rise.  GI:  Soft and non-tender.    NEURO: Bilateral upper and lower extremity coordination and muscle stretch reflexes are physiologic and symmetric. No loss of sensation is noted.  GAIT: normal.    Imaging:         ASSESSMENT: 44 y.o. year old female with     1. Cervical spondylosis  Case Request-RAD/Other Procedure Area: Bilateral C4-6 MBB with RN IV sedation      2. Bulging of cervical intervertebral disc              PLAN:   - Interventions:  Schedule bilateral C4-6 MBB #1. We will call the day  after the procedure. If patient reports at least 80% relief of pain, we will move forward with MBB #2. If patient again reports at least 80% relief of pain, we will then move forward with the RFA.   Explained the risks and benefits of the procedure in detail with the patient today in clinic along with alternative treatment options, and the patient elected to pursue the intervention.      - Anticoagulation use: No no anticoagulation     report:  Reviewed and consistent with medication use as prescribed.    - Medications:  -  Continue gabapentin 300mg BID.  We have reviewed potential side effects of this medication including daytime somnolence, weight gain and peripheral edema    Refill flexeril 10mg QHS. --We have discussed potential deleterious side effects associated with this medication including  dry mouth, headache, blurred vision, drowsiness or dizziness, loss of appetite.  Patient expresses understanding.      - Therapy:   We discussed continuing physical therapy to help manage the patient/s painful condition. The patient was counseled that muscle strengthening will improve the long term prognosis in regards to pain and may also help increase range of motion and mobility. They were told that one of the goals of physical therapy is that they learn how to do the exercises so that they can do them independently at home daily upon completion. The patient's questions were answered and they were agreeable to this course.    - Imaging: Reviewed available imaging (cervical x-ray) with patient and answered any questions they had regarding study.  MRI cervical spine reviewed    - Follow up visit: will call day after MBB      The above plan and management options were discussed at length with patient. Patient is in agreement with the above and verbalized understanding.    - I discussed the goals of interventional chronic pain management with the patient on today's visit. We discussed a multimodal and systematic  approach to pain.  This includes diagnostic and therapeutic injections, adjuvant pharmacologic treatment, physical therapy, and at times psychiatry.  I emphasized the importance of regular exercise, core strengthening and stretching, diet and weight loss as a cornerstone of long-term pain management.    - This condition does not require this patient to take time off of work, and the primary goal of our Pain Management services is to improve the patient's functional capacity.  - Patient Questions: Answered all of the patient's questions regarding diagnoses, therapy, treatment and next steps        Carole Hogan NP  Interventional Pain Management  [facility]    Disclaimer:  This note was prepared using voice recognition system and is likely to have sound alike errors that may have been overlooked even after proof reading.  Please call me with any questions

## 2024-10-30 ENCOUNTER — OFFICE VISIT (OUTPATIENT)
Dept: PAIN MEDICINE | Facility: CLINIC | Age: 44
End: 2024-10-30
Payer: COMMERCIAL

## 2024-10-30 DIAGNOSIS — M50.30 BULGING OF CERVICAL INTERVERTEBRAL DISC: ICD-10-CM

## 2024-10-30 DIAGNOSIS — M47.812 CERVICAL SPONDYLOSIS: Primary | ICD-10-CM

## 2024-10-30 PROCEDURE — 3044F HG A1C LEVEL LT 7.0%: CPT | Mod: CPTII,95,, | Performed by: NURSE PRACTITIONER

## 2024-10-30 PROCEDURE — 3066F NEPHROPATHY DOC TX: CPT | Mod: CPTII,95,, | Performed by: NURSE PRACTITIONER

## 2024-10-30 PROCEDURE — 99214 OFFICE O/P EST MOD 30 MIN: CPT | Mod: 95,,, | Performed by: NURSE PRACTITIONER

## 2024-10-30 PROCEDURE — 3061F NEG MICROALBUMINURIA REV: CPT | Mod: CPTII,95,, | Performed by: NURSE PRACTITIONER

## 2024-10-30 RX ORDER — CYCLOBENZAPRINE HCL 10 MG
10 TABLET ORAL NIGHTLY PRN
Qty: 30 TABLET | Refills: 1 | Status: SHIPPED | OUTPATIENT
Start: 2024-10-30

## 2024-10-31 ENCOUNTER — TELEPHONE (OUTPATIENT)
Dept: PAIN MEDICINE | Facility: CLINIC | Age: 44
End: 2024-10-31
Payer: COMMERCIAL

## 2024-10-31 ENCOUNTER — PATIENT MESSAGE (OUTPATIENT)
Dept: PAIN MEDICINE | Facility: CLINIC | Age: 44
End: 2024-10-31
Payer: COMMERCIAL

## 2024-11-14 NOTE — PRE-PROCEDURE INSTRUCTIONS
Spoke with patient regarding procedure scheduled on 11.26     Arrival time 0615     Has patient been sick with fever or on antibiotics within the last 7 days? No     Does the patient have any open wounds, sores or rashes? No     Does the patient have any recent fractures? no     Has patient received a vaccination within the last 7 days? No     Received the COVID vaccination? yes     Has the patient stopped all medications as directed? na     Does patient have a pacemaker, defibrillator, or implantable stimulator? No     Does the patient have a ride to and from procedure and someone reliable to remain with patient?       Is the patient diabetic? YES     Does the patient have sleep apnea? Or use O2 at home? CLAUDIA     Is the patient receiving sedation? yes     Is the patient instructed to remain NPO beginning at midnight the night before their procedure? yes     Procedure location confirmed with patient? Yes     Covid- Denies signs/symptoms. Instructed to notify PAT/MD if any changes.

## 2024-11-18 PROBLEM — Q21.12 PATENT FORAMEN OVALE: Status: ACTIVE | Noted: 2022-12-09

## 2024-11-18 PROBLEM — T78.40XA ALLERGIC REACTION CAUSED BY A DRUG: Status: RESOLVED | Noted: 2022-09-02 | Resolved: 2024-11-18

## 2024-11-18 PROBLEM — G47.33 OBSTRUCTIVE SLEEP APNEA SYNDROME: Status: ACTIVE | Noted: 2024-11-18

## 2024-11-18 PROBLEM — R00.2 PALPITATIONS: Status: RESOLVED | Noted: 2023-07-06 | Resolved: 2024-11-18

## 2024-11-26 ENCOUNTER — HOSPITAL ENCOUNTER (OUTPATIENT)
Facility: HOSPITAL | Age: 44
Discharge: HOME | End: 2024-11-26
Attending: ANESTHESIOLOGY | Admitting: ANESTHESIOLOGY
Payer: COMMERCIAL

## 2024-11-26 VITALS
WEIGHT: 175.69 LBS | HEIGHT: 61 IN | SYSTOLIC BLOOD PRESSURE: 131 MMHG | TEMPERATURE: 98 F | OXYGEN SATURATION: 97 % | HEART RATE: 70 BPM | DIASTOLIC BLOOD PRESSURE: 64 MMHG | RESPIRATION RATE: 18 BRPM | BODY MASS INDEX: 33.17 KG/M2

## 2024-11-26 DIAGNOSIS — M47.812 CERVICAL SPONDYLOSIS: ICD-10-CM

## 2024-11-26 LAB
B-HCG UR QL: NEGATIVE
CTP QC/QA: YES

## 2024-11-26 PROCEDURE — 82962 GLUCOSE BLOOD TEST: CPT | Performed by: ANESTHESIOLOGY

## 2024-11-26 PROCEDURE — 64490 INJ PARAVERT F JNT C/T 1 LEV: CPT | Mod: 50,,, | Performed by: ANESTHESIOLOGY

## 2024-11-26 PROCEDURE — 25000003 PHARM REV CODE 250: Performed by: ANESTHESIOLOGY

## 2024-11-26 PROCEDURE — 81025 URINE PREGNANCY TEST: CPT | Performed by: ANESTHESIOLOGY

## 2024-11-26 PROCEDURE — 63600175 PHARM REV CODE 636 W HCPCS: Performed by: ANESTHESIOLOGY

## 2024-11-26 PROCEDURE — 64491 INJ PARAVERT F JNT C/T 2 LEV: CPT | Mod: 50 | Performed by: ANESTHESIOLOGY

## 2024-11-26 PROCEDURE — 64491 INJ PARAVERT F JNT C/T 2 LEV: CPT | Mod: 50,,, | Performed by: ANESTHESIOLOGY

## 2024-11-26 PROCEDURE — 64490 INJ PARAVERT F JNT C/T 1 LEV: CPT | Mod: 50 | Performed by: ANESTHESIOLOGY

## 2024-11-26 RX ORDER — INDOMETHACIN 25 MG/1
CAPSULE ORAL
Status: DISCONTINUED | OUTPATIENT
Start: 2024-11-26 | End: 2024-11-26 | Stop reason: HOSPADM

## 2024-11-26 RX ORDER — BUPIVACAINE HYDROCHLORIDE 5 MG/ML
INJECTION, SOLUTION EPIDURAL; INTRACAUDAL
Status: DISCONTINUED | OUTPATIENT
Start: 2024-11-26 | End: 2024-11-26 | Stop reason: HOSPADM

## 2024-11-26 RX ORDER — DEXAMETHASONE SODIUM PHOSPHATE 10 MG/ML
INJECTION INTRAMUSCULAR; INTRAVENOUS
Status: DISCONTINUED | OUTPATIENT
Start: 2024-11-26 | End: 2024-11-26 | Stop reason: HOSPADM

## 2024-11-26 RX ORDER — FENTANYL CITRATE 50 UG/ML
INJECTION, SOLUTION INTRAMUSCULAR; INTRAVENOUS
Status: DISCONTINUED | OUTPATIENT
Start: 2024-11-26 | End: 2024-11-26 | Stop reason: HOSPADM

## 2024-11-26 RX ORDER — MIDAZOLAM HYDROCHLORIDE 1 MG/ML
INJECTION, SOLUTION INTRAMUSCULAR; INTRAVENOUS
Status: DISCONTINUED | OUTPATIENT
Start: 2024-11-26 | End: 2024-11-26 | Stop reason: HOSPADM

## 2024-11-26 NOTE — DISCHARGE INSTRUCTIONS

## 2024-11-26 NOTE — DISCHARGE SUMMARY
Discharge Note  Short Stay      SUMMARY     Admit Date: 11/26/2024    Attending Physician: Norma Swartz MD        Discharge Physician: Norma Swartz MD        Discharge Date: 11/26/2024 7:42 AM    Procedure(s) (LRB):  Bilateral C4-6 MBB with RN IV sedation (Bilateral)    Final Diagnosis: Cervical spondylosis [M47.812]    Disposition: Home or self care    Patient Instructions:   Current Discharge Medication List        CONTINUE these medications which have NOT CHANGED    Details   busPIRone (BUSPAR) 7.5 MG tablet Take 1 tablet (7.5 mg total) by mouth 2 (two) times a day.  Qty: 60 tablet, Refills: 5      cyclobenzaprine (FLEXERIL) 10 MG tablet Take 1 tablet (10 mg total) by mouth nightly as needed for Muscle spasms. One tab poqhs  Qty: 30 tablet, Refills: 1      empagliflozin (JARDIANCE) 10 mg tablet Take 1 tablet (10 mg total) by mouth every morning.  Qty: 30 tablet, Refills: 3      gabapentin (NEURONTIN) 300 MG capsule Take 1 capsule (300 mg total) by mouth 2 (two) times daily.  Qty: 60 capsule, Refills: 5      metFORMIN (GLUCOPHAGE-XR) 500 MG ER 24hr tablet 4 tabs poqd  Qty: 120 tablet, Refills: 5      metoprolol succinate (TOPROL XL) 50 MG 24 hr tablet Take 1 tablet (50 mg total) by mouth every evening.  Qty: 30 tablet, Refills: 5    Comments: .      levonorgestrel (MIRENA) 20 mcg/24 hours (5 yrs) 52 mg IUD 1 each by Intrauterine route once.                 Discharge Diagnosis: Cervical spondylosis [M47.812]  Condition on Discharge: Stable with no complications to procedure   Diet on Discharge: Same as before.  Activity: as per instruction sheet.  Discharge to: Home with a responsible adult.  Follow up: 2-4 weeks       Please call the office at [phone] if you experience any weakness or loss of sensation, fever > 101.5, pain uncontrolled with oral medications, persistent nausea/vomiting/or diarrhea, redness or drainage from the incisions, or any other worrisome concerns. If physician on call was not reached  or could not communicate with our office for any reason please go to the nearest emergency department

## 2024-11-26 NOTE — OP NOTE
CERVICAL Medial Branch Block Under Fluoroscopy  Time-out taken to identify patient and procedure side prior to starting the procedure.        Olegario Presley  [id number]      Date of Procedure: 11/26/2024                                                               PROCEDURE:  bilateral medial branch block at the transverse processes of C4, C5, C6    Pre Procedure Diagnosis:  Cervical spondylosis [M47.812]  Post Procedure Diagnosis: Same    PHYSICIAN: Norma Swartz MD    ASSISTANTS: None    Anesthesia:   Conscious sedation provided by M.D    The patient was monitored with continuous pulse oximetry, EKG, and intermittent blood pressure monitors.  The patient was hemodynamically stable throughout the entire process was responsive to voice, and breathing spontaneously.  Supplemental O2 was provided at 2L/min via nasal cannula.  Patient was comfortable for the duration of the procedure. (See nurse documentation and case log for sedation time)    There was a total of 2mg IV Midazolam and 50mcg Fentanyl titrated for the procedure      MEDICATIONS INJECTED:  0.25% Bupivicaine total 1.5mL  LOCAL ANESTHETIC USED:   Xylocaine 1% 1mL / site    SEDATION MEDICATIONS: None    ESTIMATED BLOOD LOSS:  None.    COMPLICATIONS:  None.    TECHNIQUE:   Laying in a bilateral lateral decubitus  position, the patient was prepped and draped in the usual sterile fashion using ChloraPrep and fenestrated drape.  The level was determined under fluoroscopic guidance.  Local anesthetic was given by going down to the hub of the 27-gauge 1.25in needle and raising a wheel.  A 22-gauge 3.5inch needle was introduced to the anatomic local of the medial branch at each of the stated above levels using fluoroscopy. Then after negative aspiration, a total of 1.5 cc of .25% bupivacaine and 10 mg dexamethasone was injected in divided doses at the three levels. The patient tolerated the procedure well.     The patient was monitored after the procedure.  Patient  was given post procedure and discharge instructions to follow at home.  We will see the patient back in two weeks or the patient may call to inform of status. The patient was discharged in a stable condition.    Norma Swartz

## 2024-11-27 ENCOUNTER — TELEPHONE (OUTPATIENT)
Dept: PAIN MEDICINE | Facility: CLINIC | Age: 44
End: 2024-11-27
Payer: COMMERCIAL

## 2024-11-27 NOTE — TELEPHONE ENCOUNTER
Reached out to pt to get their percent relief from the injection that they had and to answer the following questions.             1. What percentage of pain relief did you receive following the block, from 0-100%?     90%    2. What was pain score the day before your procedure on a scale from 0-10?    5/10    3. What was pain score immediately after your procedure (up to 6 hours)  on a scale from 0-10?    1/10    4. When your pain returned, what was your pain score on a scale from 0-10?    2/10     5. How many hours did pain relief last following the block?      Still in affect      6. During this time, please describe in detail the activities you were able to do?    Pt was able to turn neck but is still a little sore     Pain Disability Index (PDI) Score Review:      8/1/2024     8:07 AM   Last 3 PDI Scores   Pain Disability Index (PDI) 12          Thank you,  Nik Pascual   Medical Assistant

## 2024-12-02 DIAGNOSIS — M47.812 CERVICAL SPONDYLOSIS: Primary | ICD-10-CM

## 2024-12-03 ENCOUNTER — TELEPHONE (OUTPATIENT)
Dept: PAIN MEDICINE | Facility: CLINIC | Age: 44
End: 2024-12-03
Payer: COMMERCIAL

## 2024-12-03 NOTE — TELEPHONE ENCOUNTER
----- Message from Carole Hogan NP sent at 12/2/2024  7:13 AM CST -----  Pain Provider: Maxime  Patient Name: Olegario Presley  MRN: [id number]  Case: CERVICAL MEDIAL BRANCH BLOCKS  Level: C4-6  Laterality: bilateral  Anticoagulation: none    Call day after

## 2024-12-03 NOTE — TELEPHONE ENCOUNTER
Called to schedule pt #2 Cervical MBB, pt was scheduled for 12/20 with Dr Maxime Guerrier [facility]

## 2024-12-09 NOTE — PRE-PROCEDURE INSTRUCTIONS
Spoke with patient regarding procedure scheduled on 12.20     Arrival time 0715     Has patient been sick with fever or on antibiotics within the last 7 days? No     Does the patient have any open wounds, sores or rashes? No     Does the patient have any recent fractures? no     Has patient received a vaccination within the last 7 days? No     Received the COVID vaccination?      Has the patient stopped all medications as directed? na     Does patient have a pacemaker, defibrillator, or implantable stimulator? No-loop recorder     Does the patient have a ride to and from procedure and someone reliable to remain with patient?        Is the patient diabetic? no      Does the patient have sleep apnea? Or use O2 at home? ismael cpap     Is the patient receiving sedation?      Is the patient instructed to remain NPO beginning at midnight the night before their procedure? yes     Procedure location confirmed with patient? Yes     Covid- Denies signs/symptoms. Instructed to notify PAT/MD if any changes.

## 2024-12-20 ENCOUNTER — HOSPITAL ENCOUNTER (OUTPATIENT)
Facility: HOSPITAL | Age: 44
Discharge: HOME | End: 2024-12-20
Attending: ANESTHESIOLOGY | Admitting: ANESTHESIOLOGY
Payer: COMMERCIAL

## 2024-12-20 VITALS
WEIGHT: 176.38 LBS | TEMPERATURE: 98 F | HEIGHT: 61 IN | HEART RATE: 70 BPM | OXYGEN SATURATION: 99 % | RESPIRATION RATE: 16 BRPM | SYSTOLIC BLOOD PRESSURE: 143 MMHG | BODY MASS INDEX: 33.3 KG/M2 | DIASTOLIC BLOOD PRESSURE: 87 MMHG

## 2024-12-20 LAB
B-HCG UR QL: NEGATIVE
CTP QC/QA: YES
POCT GLUCOSE: 83 MG/DL (ref 70–110)

## 2024-12-20 PROCEDURE — 64491 INJ PARAVERT F JNT C/T 2 LEV: CPT | Mod: 50 | Performed by: ANESTHESIOLOGY

## 2024-12-20 PROCEDURE — 64490 INJ PARAVERT F JNT C/T 1 LEV: CPT | Mod: 50,,, | Performed by: ANESTHESIOLOGY

## 2024-12-20 PROCEDURE — 25000003 PHARM REV CODE 250: Performed by: ANESTHESIOLOGY

## 2024-12-20 PROCEDURE — 99152 MOD SED SAME PHYS/QHP 5/>YRS: CPT | Performed by: ANESTHESIOLOGY

## 2024-12-20 PROCEDURE — 82962 GLUCOSE BLOOD TEST: CPT | Performed by: ANESTHESIOLOGY

## 2024-12-20 PROCEDURE — 63600175 PHARM REV CODE 636 W HCPCS: Mod: JZ,JG | Performed by: ANESTHESIOLOGY

## 2024-12-20 PROCEDURE — 64491 INJ PARAVERT F JNT C/T 2 LEV: CPT | Mod: 50,,, | Performed by: ANESTHESIOLOGY

## 2024-12-20 PROCEDURE — 64490 INJ PARAVERT F JNT C/T 1 LEV: CPT | Mod: 50 | Performed by: ANESTHESIOLOGY

## 2024-12-20 RX ORDER — DEXAMETHASONE SODIUM PHOSPHATE 10 MG/ML
INJECTION INTRAMUSCULAR; INTRAVENOUS
Status: DISCONTINUED | OUTPATIENT
Start: 2024-12-20 | End: 2024-12-20 | Stop reason: HOSPADM

## 2024-12-20 RX ORDER — FENTANYL CITRATE 50 UG/ML
INJECTION, SOLUTION INTRAMUSCULAR; INTRAVENOUS
Status: DISCONTINUED | OUTPATIENT
Start: 2024-12-20 | End: 2024-12-20 | Stop reason: HOSPADM

## 2024-12-20 RX ORDER — INDOMETHACIN 25 MG/1
CAPSULE ORAL
Status: DISCONTINUED | OUTPATIENT
Start: 2024-12-20 | End: 2024-12-20 | Stop reason: HOSPADM

## 2024-12-20 RX ORDER — BUPIVACAINE HYDROCHLORIDE 5 MG/ML
INJECTION, SOLUTION EPIDURAL; INTRACAUDAL
Status: DISCONTINUED | OUTPATIENT
Start: 2024-12-20 | End: 2024-12-20 | Stop reason: HOSPADM

## 2024-12-20 RX ORDER — MIDAZOLAM HYDROCHLORIDE 1 MG/ML
INJECTION, SOLUTION INTRAMUSCULAR; INTRAVENOUS
Status: DISCONTINUED | OUTPATIENT
Start: 2024-12-20 | End: 2024-12-20 | Stop reason: HOSPADM

## 2024-12-20 NOTE — OP NOTE
CERVICAL Medial Branch Block Under Fluoroscopy  Time-out taken to identify patient and procedure side prior to starting the procedure.        Olegario Presley  [id number]      Date of Procedure: 12/20/2024                                                               PROCEDURE:  bilateral medial branch block at the transverse processes of C4, C5, C6    Pre Procedure Diagnosis:  Cervical spondylosis [M47.812]  Post Procedure Diagnosis: Same    PHYSICIAN: Norma Swartz MD    ASSISTANTS: None    Anesthesia:   Conscious sedation provided by M.D    The patient was monitored with continuous pulse oximetry, EKG, and intermittent blood pressure monitors.  The patient was hemodynamically stable throughout the entire process was responsive to voice, and breathing spontaneously.  Supplemental O2 was provided at 2L/min via nasal cannula.  Patient was comfortable for the duration of the procedure. (See nurse documentation and case log for sedation time)    There was a total of 2mg IV Midazolam and 50mcg Fentanyl titrated for the procedure      MEDICATIONS INJECTED:  0.25% Bupivicaine total 1.5mL  LOCAL ANESTHETIC USED:   Xylocaine 1% 1mL / site    SEDATION MEDICATIONS: None    ESTIMATED BLOOD LOSS:  None.    COMPLICATIONS:  None.    TECHNIQUE:   Laying in a bilateral lateral decubitus  position, the patient was prepped and draped in the usual sterile fashion using ChloraPrep and fenestrated drape.  The level was determined under fluoroscopic guidance.  Local anesthetic was given by going down to the hub of the 27-gauge 1.25in needle and raising a wheel.  A 22-gauge 3.5inch needle was introduced to the anatomic local of the medial branch at each of the stated above levels using fluoroscopy. Then after negative aspiration, a total of 1.5 cc of .25% bupivacaine and 10 mg dexamethasone was injected in divided doses at the three levels. The patient tolerated the procedure well.     The patient was monitored after the procedure.  Patient  was given post procedure and discharge instructions to follow at home.  We will see the patient back in two weeks or the patient may call to inform of status. The patient was discharged in a stable condition.    Norma Swartz

## 2024-12-20 NOTE — DISCHARGE SUMMARY
Discharge Note  Short Stay      SUMMARY     Admit Date: 12/20/2024    Attending Physician: Norma Swartz MD        Discharge Physician: Norma Swartz MD        Discharge Date: 12/20/2024 8:53 AM    Procedure(s) (LRB):  Bilateral C4-6 MBB #2 with RN IV sedation (Bilateral)    Final Diagnosis: Cervical spondylosis [M47.812]    Disposition: Home or self care    Patient Instructions:   Current Discharge Medication List        CONTINUE these medications which have NOT CHANGED    Details   busPIRone (BUSPAR) 7.5 MG tablet Take 1 tablet (7.5 mg total) by mouth 2 (two) times a day.  Qty: 60 tablet, Refills: 5      empagliflozin (JARDIANCE) 10 mg tablet Take 1 tablet (10 mg total) by mouth every morning.  Qty: 30 tablet, Refills: 3      gabapentin (NEURONTIN) 300 MG capsule Take 1 capsule (300 mg total) by mouth 2 (two) times daily.  Qty: 60 capsule, Refills: 5      metFORMIN (GLUCOPHAGE-XR) 500 MG ER 24hr tablet 4 tabs poqd  Qty: 120 tablet, Refills: 5      metoprolol succinate (TOPROL XL) 50 MG 24 hr tablet Take 1 tablet (50 mg total) by mouth every evening.  Qty: 30 tablet, Refills: 5    Comments: .      cyclobenzaprine (FLEXERIL) 10 MG tablet Take 1 tablet (10 mg total) by mouth nightly as needed for Muscle spasms. One tab poqhs  Qty: 30 tablet, Refills: 1      levonorgestrel (MIRENA) 20 mcg/24 hours (5 yrs) 52 mg IUD 1 each by Intrauterine route once.                 Discharge Diagnosis: Cervical spondylosis [M47.812]  Condition on Discharge: Stable with no complications to procedure   Diet on Discharge: Same as before.  Activity: as per instruction sheet.  Discharge to: Home with a responsible adult.  Follow up: 2-4 weeks       Please call the office at [phone] if you experience any weakness or loss of sensation, fever > 101.5, pain uncontrolled with oral medications, persistent nausea/vomiting/or diarrhea, redness or drainage from the incisions, or any other worrisome concerns. If physician on call was not  reached or could not communicate with our office for any reason please go to the nearest emergency department

## 2024-12-20 NOTE — H&P
HPI  Patient presenting for Procedure(s) (LRB):  Bilateral C4-6 MBB #2 with RN IV sedation (Bilateral)     Patient on Anti-coagulation No    No health changes since previous encounter    Past Medical History:   Diagnosis Date    Allergy     Deviated septum     Encounter for general adult medical examination without abnormal findings 09/22/2014    GERD (gastroesophageal reflux disease)     Hyperkalemia     Sleep apnea      Past Surgical History:   Procedure Laterality Date    ESOPHAGOGASTRODUODENOSCOPY N/A 5/5/2022    Procedure: EGD (ESOPHAGOGASTRODUODENOSCOPY);  Surgeon: Shreyas Newsome MD;  Location: [facility] ENDO;  Service: Gastroenterology;  Laterality: N/A;    INJECTION OF ANESTHETIC AGENT AROUND MEDIAL BRANCH NERVES INNERVATING CERVICAL FACET JOINT Bilateral 11/26/2024    Procedure: Bilateral C4-6 MBB with RN IV sedation;  Surgeon: Norma Swartz MD;  Location: [facility] PAIN MGT;  Service: Pain Management;  Laterality: Bilateral;    PLANTAR FASCIA SURGERY      SINUS SURGERY       Review of patient's allergies indicates:  No Known Allergies     No current facility-administered medications on file prior to encounter.     Current Outpatient Medications on File Prior to Encounter   Medication Sig Dispense Refill    busPIRone (BUSPAR) 7.5 MG tablet Take 1 tablet (7.5 mg total) by mouth 2 (two) times a day. 60 tablet 5    empagliflozin (JARDIANCE) 10 mg tablet Take 1 tablet (10 mg total) by mouth every morning. 30 tablet 3    gabapentin (NEURONTIN) 300 MG capsule Take 1 capsule (300 mg total) by mouth 2 (two) times daily. 60 capsule 5    metFORMIN (GLUCOPHAGE-XR) 500 MG ER 24hr tablet 4 tabs poqd 120 tablet 5    metoprolol succinate (TOPROL XL) 50 MG 24 hr tablet Take 1 tablet (50 mg total) by mouth every evening. 30 tablet 5    cyclobenzaprine (FLEXERIL) 10 MG tablet Take 1 tablet (10 mg total) by mouth nightly as needed for Muscle spasms. One tab poqhs 30 tablet 1    levonorgestrel (MIRENA) 20 mcg/24 hours (5 yrs) 52  mg IUD 1 each by Intrauterine route once.          PMHx, PSHx, Allergies, Medications reviewed in epic    ROS negative except pain complaints in HPI    OBJECTIVE:    There were no vitals taken for this visit.    PHYSICAL EXAMINATION:    GENERAL: Well appearing, in no acute distress, alert and oriented x3.  PSYCH:  Mood and affect appropriate.  SKIN: Skin color, texture, turgor normal, no rashes or lesions which will impact the procedure.  CV: RRR with palpation of the radial artery.  PULM: No evidence of respiratory difficulty, symmetric chest rise. Clear to auscultation.  NEURO: Cranial nerves grossly intact.    Plan:    Proceed with procedure as planned Procedure(s) (LRB):  Bilateral C4-6 MBB #2 with RN IV sedation (Bilateral)    Norma Swartz MD  12/20/2024

## 2024-12-23 ENCOUNTER — TELEPHONE (OUTPATIENT)
Dept: PAIN MEDICINE | Facility: CLINIC | Age: 44
End: 2024-12-23
Payer: COMMERCIAL

## 2024-12-23 NOTE — TELEPHONE ENCOUNTER
Reji Presley,    At your earliest convenience, please answer the following questions from your procedure. Please feel free to add any additional information if needed.     1. What percentage of pain relief did you receive following the block, from 0-100%? 80%    2. What was pain score the day before your procedure on a scale from 0-10? 5-6    3. What was pain score immediately after your procedure (up to 6 hours)  on a scale from 0-10? 3-4    4. When your pain returned, what was your pain score on a scale from 0-10? 4     5. How many hours did pain relief last following the block?  72 HOURS     6. During this time, please describe in detail the activities you were able to do? THE NORMAL         Pain Disability Index (PDI) Score Review:      8/1/2024     8:07 AM   Last 3 PDI Scores   Pain Disability Index (PDI) 12

## 2024-12-23 NOTE — TELEPHONE ENCOUNTER
----- Message from Med Assistant Elaine sent at 12/3/2024  8:54 AM CST -----  Regarding: Cervical MBB #2  Call and check on % of relief pt received from block if 80% or greater ask Edison to place orders for RFA.

## 2024-12-26 ENCOUNTER — TELEPHONE (OUTPATIENT)
Dept: PAIN MEDICINE | Facility: CLINIC | Age: 44
End: 2024-12-26
Payer: COMMERCIAL

## 2024-12-26 NOTE — TELEPHONE ENCOUNTER
Yaritza Santizo MA   12/23/24  4:08 PM       1. What percentage of pain relief did you receive following the block, from 0-100%? 80%     2. What was pain score the day before your procedure on a scale from 0-10? 5-6     3. What was pain score immediately after your procedure (up to 6 hours)  on a scale from 0-10? 3-4     4. When your pain returned, what was your pain score on a scale from 0-10? 4     5. How many hours did pain relief last following the block?  72 HOURS     6. During this time, please describe in detail the activities you were able to do? THE NORMAL            Pain Disability Index (PDI) Score Review:       8/1/2024     8:07 AM   Last 3 PDI Scores   Pain Disability Index (PDI) 12

## 2024-12-27 DIAGNOSIS — M47.812 CERVICAL SPONDYLOSIS: Primary | ICD-10-CM

## 2025-01-03 ENCOUNTER — TELEPHONE (OUTPATIENT)
Dept: PAIN MEDICINE | Facility: CLINIC | Age: 45
End: 2025-01-03
Payer: COMMERCIAL

## 2025-01-03 NOTE — TELEPHONE ENCOUNTER
----- Message from Carole Hogan NP sent at 12/27/2024  7:52 AM CST -----  Pain Provider: Maxime  Patient Name: Olegario Presley  MRN: [id number]  Case: CERVICAL RFA  Level: C4-6  Laterality: bilateral  Anticoagulation: none    5 week follow up

## 2025-01-06 ENCOUNTER — HOSPITAL ENCOUNTER (OUTPATIENT)
Dept: PREADMISSION TESTING | Facility: HOSPITAL | Age: 45
Discharge: HOME | End: 2025-01-06
Attending: OBSTETRICS & GYNECOLOGY
Payer: COMMERCIAL

## 2025-01-06 DIAGNOSIS — Z12.11 SCREENING FOR COLON CANCER: ICD-10-CM

## 2025-01-09 ENCOUNTER — HOSPITAL ENCOUNTER (OUTPATIENT)
Dept: PREADMISSION TESTING | Facility: HOSPITAL | Age: 45
Discharge: HOME | End: 2025-01-09
Attending: INTERNAL MEDICINE
Payer: COMMERCIAL

## 2025-01-13 NOTE — PRE-PROCEDURE INSTRUCTIONS
Spoke with patient regarding procedure scheduled on 1.24     Arrival time 0700     Has patient been sick with fever or on antibiotics within the last 7 days? No     Does the patient have any open wounds, sores or rashes? No     Does the patient have any recent fractures? no     Has patient received a vaccination within the last 7 days? No     Received the COVID vaccination? yes     Has the patient stopped all medications as directed? na     Does patient have a pacemaker, defibrillator, or implantable stimulator? No     Does the patient have a ride to and from procedure and someone reliable to remain with patient?  ELIAS      Is the patient diabetic? IR       Does the patient have sleep apnea? Or use O2 at home? CLAUDIA     Is the patient receiving sedation? Yes      Is the patient instructed to remain NPO beginning at midnight the night before their procedure? yes     Procedure location confirmed with patient? Yes     Covid- Denies signs/symptoms. Instructed to notify PAT/MD if any changes.

## 2025-01-24 ENCOUNTER — HOSPITAL ENCOUNTER (OUTPATIENT)
Facility: HOSPITAL | Age: 45
Discharge: HOME | End: 2025-01-24
Attending: ANESTHESIOLOGY | Admitting: ANESTHESIOLOGY
Payer: COMMERCIAL

## 2025-01-24 VITALS
HEIGHT: 61 IN | DIASTOLIC BLOOD PRESSURE: 90 MMHG | HEART RATE: 71 BPM | BODY MASS INDEX: 33.71 KG/M2 | OXYGEN SATURATION: 99 % | RESPIRATION RATE: 16 BRPM | WEIGHT: 178.56 LBS | SYSTOLIC BLOOD PRESSURE: 136 MMHG | TEMPERATURE: 98 F

## 2025-01-24 DIAGNOSIS — M47.812 CERVICAL SPONDYLOSIS: ICD-10-CM

## 2025-01-24 LAB
B-HCG UR QL: NEGATIVE
CTP QC/QA: YES
POCT GLUCOSE: 106 MG/DL (ref 70–110)

## 2025-01-24 PROCEDURE — 64635 DESTROY LUMB/SAC FACET JNT: CPT | Mod: 50,,, | Performed by: ANESTHESIOLOGY

## 2025-01-24 PROCEDURE — 25000003 PHARM REV CODE 250: Performed by: ANESTHESIOLOGY

## 2025-01-24 PROCEDURE — 64636 DESTROY L/S FACET JNT ADDL: CPT | Mod: 50,,, | Performed by: ANESTHESIOLOGY

## 2025-01-24 PROCEDURE — 64636 DESTROY L/S FACET JNT ADDL: CPT | Mod: 50 | Performed by: ANESTHESIOLOGY

## 2025-01-24 PROCEDURE — 99152 MOD SED SAME PHYS/QHP 5/>YRS: CPT | Performed by: ANESTHESIOLOGY

## 2025-01-24 PROCEDURE — 63600175 PHARM REV CODE 636 W HCPCS: Performed by: ANESTHESIOLOGY

## 2025-01-24 PROCEDURE — 64635 DESTROY LUMB/SAC FACET JNT: CPT | Mod: 50 | Performed by: ANESTHESIOLOGY

## 2025-01-24 RX ORDER — FENTANYL CITRATE 50 UG/ML
INJECTION, SOLUTION INTRAMUSCULAR; INTRAVENOUS
Status: DISCONTINUED | OUTPATIENT
Start: 2025-01-24 | End: 2025-01-24 | Stop reason: HOSPADM

## 2025-01-24 RX ORDER — DEXAMETHASONE SODIUM PHOSPHATE 10 MG/ML
INJECTION INTRAMUSCULAR; INTRAVENOUS
Status: DISCONTINUED | OUTPATIENT
Start: 2025-01-24 | End: 2025-01-24 | Stop reason: HOSPADM

## 2025-01-24 RX ORDER — MIDAZOLAM HYDROCHLORIDE 1 MG/ML
INJECTION, SOLUTION INTRAMUSCULAR; INTRAVENOUS
Status: DISCONTINUED | OUTPATIENT
Start: 2025-01-24 | End: 2025-01-24 | Stop reason: HOSPADM

## 2025-01-24 RX ORDER — LIDOCAINE HYDROCHLORIDE 20 MG/ML
INJECTION, SOLUTION EPIDURAL; INFILTRATION; INTRACAUDAL; PERINEURAL
Status: DISCONTINUED | OUTPATIENT
Start: 2025-01-24 | End: 2025-01-24 | Stop reason: HOSPADM

## 2025-01-24 RX ORDER — BUPIVACAINE HYDROCHLORIDE 2.5 MG/ML
INJECTION, SOLUTION EPIDURAL; INFILTRATION; INTRACAUDAL
Status: DISCONTINUED | OUTPATIENT
Start: 2025-01-24 | End: 2025-01-24 | Stop reason: HOSPADM

## 2025-01-24 RX ORDER — INDOMETHACIN 25 MG/1
CAPSULE ORAL
Status: DISCONTINUED | OUTPATIENT
Start: 2025-01-24 | End: 2025-01-24 | Stop reason: HOSPADM

## 2025-01-24 NOTE — DISCHARGE SUMMARY
Discharge Note  Short Stay      SUMMARY     Admit Date: 1/24/2025    Attending Physician: Norma Swartz MD        Discharge Physician: Norma Swartz MD        Discharge Date: 1/24/2025 8:21 AM    Procedure(s) (LRB):  bilateral C4-6 RFA with RN IV sedation (Bilateral)    Final Diagnosis: Cervical spondylosis [M47.812]    Disposition: Home or self care    Patient Instructions:   Current Discharge Medication List        CONTINUE these medications which have NOT CHANGED    Details   busPIRone (BUSPAR) 7.5 MG tablet Take 1 tablet (7.5 mg total) by mouth 2 (two) times a day.  Qty: 60 tablet, Refills: 5      cyclobenzaprine (FLEXERIL) 10 MG tablet Take 1 tablet (10 mg total) by mouth nightly as needed for Muscle spasms. One tab poqhs  Qty: 30 tablet, Refills: 1      empagliflozin (JARDIANCE) 10 mg tablet Take 1 tablet (10 mg total) by mouth every morning.  Qty: 30 tablet, Refills: 3      gabapentin (NEURONTIN) 300 MG capsule Take 1 capsule (300 mg total) by mouth 2 (two) times daily.  Qty: 60 capsule, Refills: 5      levonorgestrel (MIRENA) 20 mcg/24 hours (5 yrs) 52 mg IUD 1 each by Intrauterine route once.      metFORMIN (GLUCOPHAGE-XR) 500 MG ER 24hr tablet 4 tabs poqd  Qty: 120 tablet, Refills: 5      metoprolol succinate (TOPROL XL) 50 MG 24 hr tablet Take 1 tablet (50 mg total) by mouth every evening.  Qty: 30 tablet, Refills: 5    Comments: .                 Discharge Diagnosis: Cervical spondylosis [M47.812]  Condition on Discharge: Stable with no complications to procedure   Diet on Discharge: Same as before.  Activity: as per instruction sheet.  Discharge to: Home with a responsible adult.  Follow up: 2-4 weeks       Please call the office at [phone] if you experience any weakness or loss of sensation, fever > 101.5, pain uncontrolled with oral medications, persistent nausea/vomiting/or diarrhea, redness or drainage from the incisions, or any other worrisome concerns. If physician on call was not reached  or could not communicate with our office for any reason please go to the nearest emergency department

## 2025-01-24 NOTE — OP NOTE
Cervical Medial nerve branch block radiofrequency ablation Under Fluoroscopy     Time-out taken to identify patient and procedure side prior to starting the procedure.     1/24/2025      Patient has clinical and imaging findings suggestive of facet mediated pain and has completed 2 diagnostic medial branch blocks at specified levels with at least 80% relief for the expected duration of the local anesthetic utilized.    For supporting clinical documentation, please see most recent clinic and telephone notes.     PROCEDURE: bilateral radiofrequency ablation of the the medial branch nerves at the transverse process of  C4, C5, C6    Conscious sedation provided by MD     REASON FOR PROCEDURE: Cervical spondylosis [M47.812]     PHYSICIAN: Norma Swartz MD    ASSISTANTS: None     MEDICATIONS INJECTED: 0.25% Bupivicaine total 4mL     LOCAL ANESTHETIC USED: Xylocaine 1% 1mL / site     ESTIMATED BLOOD LOSS: None.     COMPLICATIONS: None.     Interval history: Patient reports that he had complete relief of pain for the day of the procedure, we will proceed with the RFA     TECHNIQUE: Laying in a bilateral  lateral decubitus position, the patient was prepped and draped in the usual sterile fashion using ChloraPrep and fenestrated drape. The level was determined under fluoroscopic guidance. Local anesthetic was given by going down to the hub of the 27-gauge 1.25in needle and raising a wheel. A 18-gauge 10mm curved active tip needle was introduced to the anatomic local of the medial branch at each of the stated above levels using fluoroscopy. Then sensory and motor testing was performed to confirm that the needle tips were in the correct location. Then after negative aspiration, 1 mL of 2% lidocaine was injected into each level. This was followed by thermal lesioning at 80 degrees celsius for 90 seconds.  Then after negative aspiration, 1 mL of 0.25% bupivacaine + 10 mg decadron was injected into each  level.    Sedation:  Conscious sedation provided by M.D    The patient was monitored with continuous pulse oximetry, EKG, and intermittent blood pressure monitors.  The patient was hemodynamically stable throughout the entire process was responsive to voice, and breathing spontaneously.  Supplemental O2 was provided at 2L/min via nasal cannula.  Patient was comfortable for the duration of the procedure. (See nurse documentation and case log for sedation time)    There was a total of 2mg IV Midazolam and 50mcg Fentanyl titrated for the procedure      The patient tolerated the procedure well. Did not have any procedure related motor deficit at the conclusion of the procedure    The patient was monitored after the procedure. Patient was given post procedure and discharge instructions to follow at home. We will see the patient back in two weeks or the patient may call to inform of status. The patient was discharged in a stable condition

## 2025-01-24 NOTE — H&P
HPI  Patient presenting for Procedure(s) (LRB):  bilateral C4-6 RFA with RN IV sedation (Bilateral)     Patient on Anti-coagulation No    No health changes since previous encounter    Past Medical History:   Diagnosis Date    Allergy     Deviated septum     Encounter for general adult medical examination without abnormal findings 09/22/2014    GERD (gastroesophageal reflux disease)     Hyperkalemia     Sleep apnea      Past Surgical History:   Procedure Laterality Date    ESOPHAGOGASTRODUODENOSCOPY N/A 5/5/2022    Procedure: EGD (ESOPHAGOGASTRODUODENOSCOPY);  Surgeon: Shreyas Newsome MD;  Location: [facility] ENDO;  Service: Gastroenterology;  Laterality: N/A;    INJECTION OF ANESTHETIC AGENT AROUND MEDIAL BRANCH NERVES INNERVATING CERVICAL FACET JOINT Bilateral 11/26/2024    Procedure: Bilateral C4-6 MBB with RN IV sedation;  Surgeon: Norma Swartz MD;  Location: [facility] PAIN MGT;  Service: Pain Management;  Laterality: Bilateral;    INJECTION OF ANESTHETIC AGENT AROUND MEDIAL BRANCH NERVES INNERVATING CERVICAL FACET JOINT Bilateral 12/20/2024    Procedure: Bilateral C4-6 MBB #2 with RN IV sedation;  Surgeon: Norma Swartz MD;  Location: [facility] PAIN MGT;  Service: Pain Management;  Laterality: Bilateral;    PLANTAR FASCIA SURGERY      SINUS SURGERY       Review of patient's allergies indicates:  No Known Allergies     No current facility-administered medications on file prior to encounter.     Current Outpatient Medications on File Prior to Encounter   Medication Sig Dispense Refill    busPIRone (BUSPAR) 7.5 MG tablet Take 1 tablet (7.5 mg total) by mouth 2 (two) times a day. 60 tablet 5    cyclobenzaprine (FLEXERIL) 10 MG tablet Take 1 tablet (10 mg total) by mouth nightly as needed for Muscle spasms. One tab poqhs 30 tablet 1    empagliflozin (JARDIANCE) 10 mg tablet Take 1 tablet (10 mg total) by mouth every morning. 30 tablet 3    gabapentin (NEURONTIN) 300 MG capsule Take 1 capsule (300 mg total) by mouth 2 (two)  "times daily. 60 capsule 5    levonorgestrel (MIRENA) 20 mcg/24 hours (5 yrs) 52 mg IUD 1 each by Intrauterine route once.      metFORMIN (GLUCOPHAGE-XR) 500 MG ER 24hr tablet 4 tabs poqd 120 tablet 5    metoprolol succinate (TOPROL XL) 50 MG 24 hr tablet Take 1 tablet (50 mg total) by mouth every evening. 30 tablet 5        PMHx, PSHx, Allergies, Medications reviewed in epic    ROS negative except pain complaints in HPI    OBJECTIVE:    /77 (BP Location: Right arm, Patient Position: Sitting)   Pulse 78   Temp 97.1 °F (36.2 °C)   Resp 17   Ht 5' 1" (1.549 m)   Wt 81 kg (178 lb 9.2 oz)   SpO2 100%   Breastfeeding No   BMI 33.74 kg/m²     PHYSICAL EXAMINATION:    GENERAL: Well appearing, in no acute distress, alert and oriented x3.  PSYCH:  Mood and affect appropriate.  SKIN: Skin color, texture, turgor normal, no rashes or lesions which will impact the procedure.  CV: RRR with palpation of the radial artery.  PULM: No evidence of respiratory difficulty, symmetric chest rise. Clear to auscultation.  NEURO: Cranial nerves grossly intact.    Plan:    Proceed with procedure as planned Procedure(s) (LRB):  bilateral C4-6 RFA with RN IV sedation (Bilateral)    Norma Swartz MD  01/24/2025            "

## 2025-01-24 NOTE — DISCHARGE INSTRUCTIONS

## 2025-01-27 ENCOUNTER — HOSPITAL ENCOUNTER (OUTPATIENT)
Dept: PREADMISSION TESTING | Facility: HOSPITAL | Age: 45
Discharge: HOME | End: 2025-01-27
Attending: INTERNAL MEDICINE
Payer: COMMERCIAL

## 2025-01-28 ENCOUNTER — TELEPHONE (OUTPATIENT)
Dept: PREADMISSION TESTING | Facility: HOSPITAL | Age: 45
End: 2025-01-28
Payer: COMMERCIAL

## 2025-01-28 NOTE — TELEPHONE ENCOUNTER
----- Message from Summer sent at 1/27/2025  4:19 PM CST -----  Contact: Olegario Norris called asking for advice about her appointment today. She not receive a call today. Please call patient at [phone]. Thanks!

## 2025-01-28 NOTE — LETTER
Cardiac Clearance Request    Olegario Presley  1980  [id number]    Request Sent to: Dr. Maninder Benito  Ordering Physician: [facility]    We will be scheduling your patient for a(n) colonoscopy using MAC anesthesia.    Please choose one of the following:  [] Patient is at low/moderate risk for procedure/anesthesia from cardiac standpoint.   [] Patient is at increased risk but not prohibited risk from cardiac standpoint. To minimize risk, we recommend the following:   ___________________________________________________________________  ___________________________________________________________________  [] Patient is at prohibited risk from cardiac standpoint for above procedure. Reason/recommendation:   ______________________________________________________________________   ______________________________________________________________________    ______________________________________________________________________                                          (Physician Signature)                                            (Date)      We will also need to following information prior to scheduling an appointment:   patient demographics, insurance coverage, recent office visit note, current medication(s) and known allergies, recent labs, recent imaging and recent endoscopy procedure results.     Send completed form with required documents to  [facility]   Endoscopy Scheduling Department  Email to [email or web address] or Fax to [phone]  If you have any questions, please contact scheduling dept at [phone]

## 2025-01-30 ENCOUNTER — HOSPITAL ENCOUNTER (OUTPATIENT)
Dept: PREADMISSION TESTING | Facility: HOSPITAL | Age: 45
Discharge: HOME | End: 2025-01-30
Attending: COLON & RECTAL SURGERY
Payer: COMMERCIAL

## 2025-01-30 DIAGNOSIS — Z12.11 SCREENING FOR COLON CANCER: Primary | ICD-10-CM

## 2025-01-30 RX ORDER — SODIUM, POTASSIUM,MAG SULFATES 17.5-3.13G
1 SOLUTION, RECONSTITUTED, ORAL ORAL DAILY
Qty: 1 KIT | Refills: 0 | Status: SHIPPED | OUTPATIENT
Start: 2025-01-30 | End: 2025-02-01

## 2025-02-14 ENCOUNTER — HOSPITAL ENCOUNTER (OUTPATIENT)
Dept: ENDOSCOPY | Facility: HOSPITAL | Age: 45
Discharge: HOME | End: 2025-02-14
Attending: OBSTETRICS & GYNECOLOGY | Admitting: INTERNAL MEDICINE
Payer: COMMERCIAL

## 2025-02-14 ENCOUNTER — ANESTHESIA (OUTPATIENT)
Dept: ENDOSCOPY | Facility: HOSPITAL | Age: 45
End: 2025-02-14
Payer: COMMERCIAL

## 2025-02-14 ENCOUNTER — ANESTHESIA EVENT (OUTPATIENT)
Dept: ENDOSCOPY | Facility: HOSPITAL | Age: 45
End: 2025-02-14
Payer: COMMERCIAL

## 2025-02-14 VITALS
OXYGEN SATURATION: 100 % | BODY MASS INDEX: 32.1 KG/M2 | DIASTOLIC BLOOD PRESSURE: 68 MMHG | RESPIRATION RATE: 17 BRPM | TEMPERATURE: 98 F | HEART RATE: 74 BPM | WEIGHT: 170 LBS | SYSTOLIC BLOOD PRESSURE: 132 MMHG | HEIGHT: 61 IN

## 2025-02-14 DIAGNOSIS — Z12.11 SCREENING FOR COLON CANCER: ICD-10-CM

## 2025-02-14 DIAGNOSIS — Z12.11 ENCOUNTER FOR SCREENING COLONOSCOPY: Primary | ICD-10-CM

## 2025-02-14 LAB
B-HCG UR QL: NEGATIVE
CTP QC/QA: YES

## 2025-02-14 PROCEDURE — 37000008 HC ANESTHESIA 1ST 15 MINUTES

## 2025-02-14 PROCEDURE — 37000009 HC ANESTHESIA EA ADD 15 MINS

## 2025-02-14 PROCEDURE — 63600175 PHARM REV CODE 636 W HCPCS: Performed by: NURSE ANESTHETIST, CERTIFIED REGISTERED

## 2025-02-14 PROCEDURE — 81025 URINE PREGNANCY TEST: CPT | Performed by: INTERNAL MEDICINE

## 2025-02-14 PROCEDURE — G0121 COLON CA SCRN NOT HI RSK IND: HCPCS | Performed by: INTERNAL MEDICINE

## 2025-02-14 PROCEDURE — 25000003 PHARM REV CODE 250: Performed by: INTERNAL MEDICINE

## 2025-02-14 PROCEDURE — G0121 COLON CA SCRN NOT HI RSK IND: HCPCS | Mod: ,,, | Performed by: INTERNAL MEDICINE

## 2025-02-14 RX ORDER — DEXTROMETHORPHAN/PSEUDOEPHED 2.5-7.5/.8
DROPS ORAL
Status: COMPLETED | OUTPATIENT
Start: 2025-02-14 | End: 2025-02-14

## 2025-02-14 RX ORDER — PROPOFOL 10 MG/ML
VIAL (ML) INTRAVENOUS
Status: DISCONTINUED | OUTPATIENT
Start: 2025-02-14 | End: 2025-02-14

## 2025-02-14 RX ORDER — LIDOCAINE HYDROCHLORIDE 10 MG/ML
INJECTION, SOLUTION EPIDURAL; INFILTRATION; INTRACAUDAL; PERINEURAL
Status: DISCONTINUED | OUTPATIENT
Start: 2025-02-14 | End: 2025-02-14

## 2025-02-14 RX ADMIN — PROPOFOL 30 MG: 10 INJECTION, EMULSION INTRAVENOUS at 12:02

## 2025-02-14 RX ADMIN — SIMETHICONE 200 MG: 20 SUSPENSION/ DROPS ORAL at 12:02

## 2025-02-14 RX ADMIN — LIDOCAINE HYDROCHLORIDE 50 MG: 10 SOLUTION INTRAVENOUS at 12:02

## 2025-02-14 RX ADMIN — PROPOFOL 40 MG: 10 INJECTION, EMULSION INTRAVENOUS at 12:02

## 2025-02-14 RX ADMIN — PROPOFOL 80 MG: 10 INJECTION, EMULSION INTRAVENOUS at 12:02

## 2025-02-14 NOTE — DISCHARGE SUMMARY
[facility] ([facility]ospital)  Discharge Note  Short Stay    Colonoscopy      OUTCOME: Patient tolerated treatment/procedure well without complication and is now ready for discharge.    DISPOSITION: Home or Self Care    FINAL DIAGNOSIS:  Encounter for screening colonoscopy    FOLLOWUP: With primary care provider    DISCHARGE INSTRUCTIONS:  No discharge procedures on file.

## 2025-02-14 NOTE — H&P
Short Stay Endoscopy History and Physical    PCP - Trista Key MD    Procedure - Colonoscopy  ASA - per anesthesia  Mallampati - per anesthesia  History of Anesthesia problems - no  Family history Anesthesia problems -  no     HPI:  This is a 44 y.o. female here for evaluation of :   Active Hospital Problems    Diagnosis  POA    *Encounter for screening colonoscopy [Z12.11]  Not Applicable      Resolved Hospital Problems   No resolved problems to display.         Health Maintenance         Date Due Completion Date    HIV Screening Never done ---    TETANUS VACCINE 05/10/2024 5/10/2014 (Done)    Override on 5/10/2014: Done    Influenza Vaccine (1) 09/01/2024 11/13/2023    COVID-19 Vaccine (4 - 2024-25 season) 09/01/2024 12/27/2021    Colorectal Cancer Screening Never done ---    Mammogram 11/18/2025 11/18/2024    Override on 10/22/2019: Done    Hemoglobin A1c (Diabetic Prevention Screening) 10/23/2027 10/23/2024    Lipid Panel 10/23/2029 10/23/2024    Cervical Cancer Screening 11/18/2029 11/18/2024    RSV Vaccine (Age 60+ and Pregnant patients) (1 - 1-dose 75+ series) 10/22/2054 ---              ROS:  CONSTITUTIONAL: Denies weight change,  fatigue, fevers, chills, night sweats.  CARDIOVASCULAR: Denies chest pain, shortness of breath, orthopnea and edema.  RESPIRATORY: Denies cough, hemoptysis, dyspnea, and wheezing.  GI: See HPI.    Medical History:   Past Medical History:   Diagnosis Date    Allergy     Deviated septum     Encounter for general adult medical examination without abnormal findings 09/22/2014    GERD (gastroesophageal reflux disease)     Hyperkalemia     Sleep apnea        Surgical History:   Past Surgical History:   Procedure Laterality Date    ESOPHAGOGASTRODUODENOSCOPY N/A 5/5/2022    Procedure: EGD (ESOPHAGOGASTRODUODENOSCOPY);  Surgeon: Shreyas Newsome MD;  Location: [facility];  Service: Gastroenterology;  Laterality: N/A;    INJECTION OF ANESTHETIC AGENT AROUND MEDIAL BRANCH NERVES  INNERVATING CERVICAL FACET JOINT Bilateral 11/26/2024    Procedure: Bilateral C4-6 MBB with RN IV sedation;  Surgeon: Norma Swartz MD;  Location: [facility];  Service: Pain Management;  Laterality: Bilateral;    INJECTION OF ANESTHETIC AGENT AROUND MEDIAL BRANCH NERVES INNERVATING CERVICAL FACET JOINT Bilateral 12/20/2024    Procedure: Bilateral C4-6 MBB #2 with RN IV sedation;  Surgeon: Norma Swartz MD;  Location: [facility];  Service: Pain Management;  Laterality: Bilateral;    PLANTAR FASCIA SURGERY      RADIOFREQUENCY THERMOCOAGULATION Bilateral 1/24/2025    Procedure: bilateral C4-6 RFA with RN IV sedation;  Surgeon: Norma Swartz MD;  Location: [facility];  Service: Pain Management;  Laterality: Bilateral;    SINUS SURGERY         Family History:   Family History   Problem Relation Name Age of Onset    Breast cancer Mother Rosemarie Batista     Cancer Mother Rosemarie Batista     Diabetes Mother Rosemarie Batista     Hypertension Mother Rosemarie Batista     Heart disease Father      Hypertension Sister Barry Batista     Asthma Daughter      Breast cancer Maternal Grandmother Jeanne Barnes     Hypertension Sister Beronica Hernandez     Hypertension Brother Rommel Batista        Social History:   Social History     Tobacco Use    Smoking status: Never    Smokeless tobacco: Never   Substance Use Topics    Alcohol use: Not Currently    Drug use: No       Allergies:   Review of patient's allergies indicates:  No Known Allergies    Medications:   Current Outpatient Medications on File Prior to Encounter   Medication Sig Dispense Refill    busPIRone (BUSPAR) 7.5 MG tablet Take 1 tablet (7.5 mg total) by mouth 2 (two) times a day. 60 tablet 5    cyclobenzaprine (FLEXERIL) 10 MG tablet Take 1 tablet (10 mg total) by mouth nightly as needed for Muscle spasms. One tab poqhs 30 tablet 1    gabapentin (NEURONTIN) 300 MG capsule Take 1 capsule (300 mg total) by mouth 2 (two) times daily. 60 capsule 5    metFORMIN (GLUCOPHAGE-XR)  500 MG ER 24hr tablet 4 tabs poqd 120 tablet 5    metoprolol succinate (TOPROL XL) 50 MG 24 hr tablet Take 1 tablet (50 mg total) by mouth every evening. 30 tablet 5    empagliflozin (JARDIANCE) 10 mg tablet Take 1 tablet (10 mg total) by mouth every morning. 30 tablet 3    levonorgestrel (MIRENA) 20 mcg/24 hours (5 yrs) 52 mg IUD 1 each by Intrauterine route once.       No current facility-administered medications on file prior to encounter.       Physical Exam:  Vital Signs:   Vitals:    02/14/25 1111   BP: (!) 148/84   Pulse: 72   Resp: 19   Temp: 98.1 °F (36.7 °C)     General Appearance: Well appearing in no acute distress  ENT: OP clear  Chest: CTA B  CV: RRR, no m/r/g  Abd: s/nt/nd/nabs  Ext: no edema    Labs:Reviewed    IMP:  Active Hospital Problems    Diagnosis  POA    *Encounter for screening colonoscopy [Z12.11]  Not Applicable      Resolved Hospital Problems   No resolved problems to display.         Plan:   I have explained the risks and benefits of colonoscopy to the patient including but not limited to bleeding, perforation, infection, and death. The patient wishes to proceed.

## 2025-02-14 NOTE — PLAN OF CARE
Dr Alvarado came to bedside and discussed findings. NO N/V,  no abdominal pain, no GI bleeding, and vitals stable.  Pt discharged from unit.

## 2025-02-14 NOTE — ANESTHESIA PREPROCEDURE EVALUATION
02/14/2025  Olegario Presley is a 44 y.o., female.      Pre-op Assessment    I have reviewed the Patient Summary Reports.     I have reviewed the Nursing Notes. I have reviewed the NPO Status.   I have reviewed the Medications.     Review of Systems  Anesthesia Hx:  No problems with previous Anesthesia             Denies Family Hx of Anesthesia complications.    Denies Personal Hx of Anesthesia complications.                    Social:  Non-Smoker       Hematology/Oncology:  Hematology Normal   Oncology Normal                                   EENT/Dental:  EENT/Dental Normal           Cardiovascular:     Hypertension           hyperlipidemia                               Pulmonary:        Sleep Apnea                Renal/:  Renal/ Normal                 Hepatic/GI:     GERD                Musculoskeletal:  Arthritis               Neurological:    Neuromuscular Disease,                                   Endocrine:        Obesity / BMI > 30  Dermatological:  Skin Normal    Psych:  Psychiatric History                  Physical Exam  General: Cooperative, Alert and Oriented    Airway:  Mallampati: II   Mouth Opening: Normal  TM Distance: Normal  Tongue: Normal  Neck ROM: Normal ROM    Dental:  Intact    Chest/Lungs:  Clear to auscultation, Normal Respiratory Rate    Heart:  Rate: Normal  Rhythm: Regular Rhythm        Anesthesia Plan  Type of Anesthesia, risks & benefits discussed:    Anesthesia Type: MAC  Intra-op Monitoring Plan: Standard ASA Monitors  Induction:  IV  Informed Consent: Informed consent signed with the Patient and all parties understand the risks and agree with anesthesia plan.  All questions answered.   ASA Score: 2  Day of Surgery Review of History & Physical: H&P Update referred to the surgeon/provider.I have interviewed and examined the patient. I have reviewed the patient's H&P dated:  There are no significant changes.     Ready For Surgery From Anesthesia Perspective.     .

## 2025-02-14 NOTE — ANESTHESIA POSTPROCEDURE EVALUATION
Anesthesia Post Evaluation    Patient: Olegario Presley    Procedure(s) Performed: * No procedures listed *    Final Anesthesia Type: MAC      Patient location during evaluation: PACU  Patient participation: Yes- Able to Participate  Level of consciousness: awake and alert  Post-procedure vital signs: reviewed and stable  Pain management: adequate  Airway patency: patent    PONV status at discharge: No PONV  Anesthetic complications: no      Cardiovascular status: blood pressure returned to baseline  Respiratory status: unassisted and room air  Hydration status: euvolemic  Follow-up not needed.              Vitals Value Taken Time   /64 02/14/25 1246   Temp 36.6 °C (97.8 °F) 02/14/25 1246   Pulse 78 02/14/25 1246   Resp 18 02/14/25 1246   SpO2 100 % 02/14/25 1246         No case tracking events are documented in the log.      Pain/Fernando Score: Fernando Score: 9 (2/14/2025 12:46 PM)

## 2025-02-14 NOTE — TRANSFER OF CARE
"Anesthesia Transfer of Care Note    Patient: Olegario Presley    Procedure(s) Performed: * No procedures listed *    Patient location: PACU    Anesthesia Type: MAC    Transport from OR: Transported from OR on room air with adequate spontaneous ventilation    Post pain: adequate analgesia    Post assessment: no apparent anesthetic complications    Post vital signs: stable    Level of consciousness: responds to stimulation    Nausea/Vomiting: no nausea/vomiting    Complications: none    Transfer of care protocol was followed      Last vitals: Visit Vitals  BP (!) 148/84 (BP Location: Left arm, Patient Position: Sitting)   Pulse 72   Temp 36.7 °C (98.1 °F) (Temporal)   Resp 19   Ht 5' 1" (1.549 m)   Wt 77.1 kg (170 lb)   SpO2 100%   Breastfeeding No   BMI 32.12 kg/m²     "

## 2025-02-17 VITALS
BODY MASS INDEX: 32.1 KG/M2 | HEART RATE: 74 BPM | TEMPERATURE: 98 F | OXYGEN SATURATION: 100 % | SYSTOLIC BLOOD PRESSURE: 132 MMHG | DIASTOLIC BLOOD PRESSURE: 68 MMHG | RESPIRATION RATE: 17 BRPM | HEIGHT: 61 IN | WEIGHT: 170 LBS

## 2025-03-03 NOTE — PROGRESS NOTES
Established Patient - TeleHealth Visit    The patient location is: [place]  The chief complaint leading to consultation is: chronic pain     Visit type: audiovisual    Encounter includes face to face time and non-face to face time preparing to see the patient (eg, review of tests), Obtaining and/or reviewing separately obtained history, Documenting clinical information in the electronic or other health record, Independently interpreting results (not separately reported) and communicating results to the patient/family/caregiver, or Care coordination (not separately reported).     Each patient to whom he or she provides medical services by telemedicine is:  (1) informed of the relationship between the physician and patient and the respective role of any other health care provider with respect to management of the patient; and (2) notified that he or she may decline to receive medical services by telemedicine and may withdraw from such care at any time.        Chronic Pain Note     Referring Physician: No ref. provider found    PCP: Trista Key MD    Chief Complaint:   No chief complaint on file.       SUBJECTIVE:  Interval History (3/5/2025):  Patient Olegario Presley presents today for follow-up visit.  Patient was last seen on 1/24/2025 bilateral C4-6 RFA with 80% relief and increased ROM of the cervical spine. She hs been able to stop the flexeril because hasn't needed it and rates her pain today 0/10. No new issues or complaints at this time.    Interval History (10/30/2024):  Patient Olegario Presley presents today for follow-up visit.  Patient is being seen for follow up of neck pain. She rates her pain 5/10 today. Pain is remaining axial in the neck without radiculopathy and is worse with rotation of the neck. She has been in PT for 8 weeks and at times gets relief but other times the neck and traps feel tight. She has had some relief with dry needling when the tightness in the muscles happens.   Patient denies  any other complaints or concerns at this time.    Interval history 8/1/2024  Olegario Presley is a 44 y.o. female with past medical history significant for generalized anxiety disorder, hyperlipidemia, hypertension, obesity, insulin resistance, GERD, sleep apnea who presents to the clinic for the evaluation of neck, head, arm and left knee pain.  Patient reports neck pain has been present since June without inciting accident injury or trauma.  Pain is intermittent and today is rated a 2/10.  Pain is described as sharp and stabbing in nature.  Patient reports pain in bilateral cervical paraspinous musculature which can radiate superiorly and greater and lesser occipital nerve distributions.  She does report generalized fatigue in the upper extremities and numbness and tingling in the fingertips but denies outright cervical radiculopathy.  Pain in the neck and arms can be exacerbated with cervical flexion/extension lateral rotation as well as use of the upper extremities.  Pain can be improved with heat, sitting still and recent prescription of gabapentin from her PCP, Dr. Slater.  Patient is currently taking gabapentin 300 mg twice daily.  She denies any side effects from this medication.  Patient has been performing physician directed physical therapy exercises for neck and arm pain over the last 8 weeks from 06/01/2024 through 08/01/2024 with marginal improvement in her symptoms.  Patient has not received prior interventional treatment.    She also reports new onset left knee pain.  This pain has been present for 1 day without inciting accident injury or trauma.  Patient reports pain along the suprapatellar aspect of the left knee, which is worse with standing and ambulation.  She denies significant lower extremity weakness.  She has never received interventional treatment for knee pain.    Patient denies night fever/night sweats, urinary incontinence, bowel incontinence, significant weight loss, and significant  motor weakness.  Patient reports loss of sensations.      Pain Disability Index Review:         8/1/2024     8:07 AM   Last 3 PDI Scores   Pain Disability Index (PDI) 12       Non-Pharmacologic Treatments:  Physical Therapy/Home Exercise: yes  Ice/Heat:yes  TENS: no  Acupuncture: no  Massage: no  Chiropractic: no    Other: no      Pain Medications:  - Adjuvant Medications: Cyclobenzaprine (Flexeril) and Neurontin (Gabapentin)    Pain Procedures:   1/24/2025 bilateral C4-6 RFA with 80% relief and increased ROM of the cervical spine    Past Medical History:   Diagnosis Date    Allergy     Deviated septum     Encounter for general adult medical examination without abnormal findings 09/22/2014    GERD (gastroesophageal reflux disease)     Hyperkalemia     Sleep apnea      Past Surgical History:   Procedure Laterality Date    ESOPHAGOGASTRODUODENOSCOPY N/A 5/5/2022    Procedure: EGD (ESOPHAGOGASTRODUODENOSCOPY);  Surgeon: Shreyas Newsome MD;  Location: [facility];  Service: Gastroenterology;  Laterality: N/A;    INJECTION OF ANESTHETIC AGENT AROUND MEDIAL BRANCH NERVES INNERVATING CERVICAL FACET JOINT Bilateral 11/26/2024    Procedure: Bilateral C4-6 MBB with RN IV sedation;  Surgeon: Norma Swartz MD;  Location: [facility];  Service: Pain Management;  Laterality: Bilateral;    INJECTION OF ANESTHETIC AGENT AROUND MEDIAL BRANCH NERVES INNERVATING CERVICAL FACET JOINT Bilateral 12/20/2024    Procedure: Bilateral C4-6 MBB #2 with RN IV sedation;  Surgeon: Norma Swartz MD;  Location: [facility];  Service: Pain Management;  Laterality: Bilateral;    PLANTAR FASCIA SURGERY      RADIOFREQUENCY THERMOCOAGULATION Bilateral 1/24/2025    Procedure: bilateral C4-6 RFA with RN IV sedation;  Surgeon: Norma Swartz MD;  Location: [facility];  Service: Pain Management;  Laterality: Bilateral;    SINUS SURGERY       Review of patient's allergies indicates:  No Known Allergies    Current Outpatient Medications    Medication Sig    atorvastatin (LIPITOR) 20 MG tablet Take 1 tablet (20 mg total) by mouth every evening.    busPIRone (BUSPAR) 7.5 MG tablet Take 1 tablet (7.5 mg total) by mouth 2 (two) times a day.    cyclobenzaprine (FLEXERIL) 10 MG tablet Take 1 tablet (10 mg total) by mouth nightly as needed for Muscle spasms. One tab poqhs    gabapentin (NEURONTIN) 300 MG capsule Take 1 capsule (300 mg total) by mouth 2 (two) times daily.    levonorgestrel (MIRENA) 20 mcg/24 hours (5 yrs) 52 mg IUD 1 each by Intrauterine route once.    metFORMIN (GLUCOPHAGE-XR) 500 MG ER 24hr tablet 4 tabs poqd    metoprolol succinate (TOPROL XL) 50 MG 24 hr tablet Take 1 tablet (50 mg total) by mouth every evening.    omeprazole (PRILOSEC) 40 MG capsule Take 1 capsule (40 mg total) by mouth every morning.    tirzepatide, weight loss, (ZEPBOUND) 2.5 mg/0.5 mL PnIj Inject 2.5 mg into the skin every 7 days.     No current facility-administered medications for this visit.         ROS:  GENERAL:  No weight loss, malaise or fevers.  HEENT:   No recent changes in vision or hearing  NECK:  Negative for lumps, no difficulty with swallowing.  RESPIRATORY:  Negative for cough, wheezing or shortness of breath, patient denies any recent URI.  CARDIOVASCULAR:  Negative for chest pain or palpitations.  GI:  Negative for abdominal discomfort, blood in stools or black stools or change in bowel habits.  MUSCULOSKELETAL:  See HPI.  SKIN:  Negative for lesions, rash, and itching.  PSYCH:  No mood disorder or recent psychosocial stressors.   HEMATOLOGY/LYMPHOLOGY:  Negative for prolonged bleeding, bruising easily or swollen nodes.    NEURO:   No history of syncope, paralysis, seizures or tremors.  All other reviewed and negative other than HPI.      Telemedicine Exam  There were no vitals filed for this visit.  There is no height or weight on file to calculate BMI.      Physical Exam: last in clinic visit:    OBJECTIVE:    There were no vitals taken for this  visit.      Physical Exam:    GENERAL: Well appearing, in no acute distress, alert and oriented x3.  PSYCH:  Mood and affect appropriate.  SKIN: Skin color, texture, turgor normal, no rashes or lesions.  HEAD/FACE:  Normocephalic, atraumatic. Cranial nerves grossly intact.    NECK:  pain to palpation over the cervical paraspinous muscles. Spurling Negative.  pain with neck flexion, extension, or lateral flexion. Full ROM  Normaltesting biceps, triceps and brachioradialis bilaterally.    NegativeHoffmann's bilaterally.    5/5 strength testing deltoid, biceps, triceps, wrist extensor, wrist flexor and ulnar intrinsics bilaterally.    Normal  strength bilaterally    CV: RRR with palpation of the radial artery.  PULM: No evidence of respiratory difficulty, symmetric chest rise.  GI:  Soft and non-tender.    NEURO: Bilateral upper and lower extremity coordination and muscle stretch reflexes are physiologic and symmetric. No loss of sensation is noted.  GAIT: normal.    Imaging:         ASSESSMENT: 44 y.o. year old female with     1. Cervical spondylosis        2. Bulging of cervical intervertebral disc                PLAN:   - Interventions:  none at this time    - Anticoagulation use: No no anticoagulation     report:  Reviewed and consistent with medication use as prescribed.    - Medications:  -  Continue gabapentin 300mg BID.  We have reviewed potential side effects of this medication including daytime somnolence, weight gain and peripheral edema    Pt currently not needing flexeril 10mg QHS. --We have discussed potential deleterious side effects associated with this medication including  dry mouth, headache, blurred vision, drowsiness or dizziness, loss of appetite.  Patient expresses understanding.    May take tylenol as needed  - Therapy:   We discussed continuing physical therapy to help manage the patient/s painful condition. The patient was counseled that muscle strengthening will improve the long term  prognosis in regards to pain and may also help increase range of motion and mobility. They were told that one of the goals of physical therapy is that they learn how to do the exercises so that they can do them independently at home daily upon completion. The patient's questions were answered and they were agreeable to this course.    - Imaging: Reviewed available imaging (cervical x-ray) with patient and answered any questions they had regarding study.  MRI cervical spine reviewed    - Follow up visit: 5-6 months      The above plan and management options were discussed at length with patient. Patient is in agreement with the above and verbalized understanding.    - I discussed the goals of interventional chronic pain management with the patient on today's visit. We discussed a multimodal and systematic approach to pain.  This includes diagnostic and therapeutic injections, adjuvant pharmacologic treatment, physical therapy, and at times psychiatry.  I emphasized the importance of regular exercise, core strengthening and stretching, diet and weight loss as a cornerstone of long-term pain management.    - This condition does not require this patient to take time off of work, and the primary goal of our Pain Management services is to improve the patient's functional capacity.  - Patient Questions: Answered all of the patient's questions regarding diagnoses, therapy, treatment and next steps      Visit today included increased complexity associated with the care of the episodic problem of chronic pain which was addressed and continue to manage the longitudinal care of the patient due to the serious and/or complex managed problem(s) listed above.      Carole Hogan NP  Interventional Pain Management  [facility]    Disclaimer:  This note was prepared using voice recognition system and is likely to have sound alike errors that may have been overlooked even after proof reading.  Please call me with any  questions

## 2025-03-05 ENCOUNTER — TELEPHONE (OUTPATIENT)
Dept: PAIN MEDICINE | Facility: CLINIC | Age: 45
End: 2025-03-05
Payer: COMMERCIAL

## 2025-03-05 ENCOUNTER — OFFICE VISIT (OUTPATIENT)
Dept: PAIN MEDICINE | Facility: CLINIC | Age: 45
End: 2025-03-05
Payer: COMMERCIAL

## 2025-03-05 DIAGNOSIS — M47.812 CERVICAL SPONDYLOSIS: Primary | ICD-10-CM

## 2025-03-05 DIAGNOSIS — M50.30 BULGING OF CERVICAL INTERVERTEBRAL DISC: ICD-10-CM

## 2025-03-05 PROCEDURE — G2211 COMPLEX E/M VISIT ADD ON: HCPCS | Mod: 95,,, | Performed by: NURSE PRACTITIONER

## 2025-03-05 PROCEDURE — 3061F NEG MICROALBUMINURIA REV: CPT | Mod: CPTII,95,, | Performed by: NURSE PRACTITIONER

## 2025-03-05 PROCEDURE — 98005 SYNCH AUDIO-VIDEO EST LOW 20: CPT | Mod: 95,,, | Performed by: NURSE PRACTITIONER

## 2025-03-05 PROCEDURE — 3066F NEPHROPATHY DOC TX: CPT | Mod: CPTII,95,, | Performed by: NURSE PRACTITIONER

## 2025-03-05 PROCEDURE — 3044F HG A1C LEVEL LT 7.0%: CPT | Mod: CPTII,95,, | Performed by: NURSE PRACTITIONER

## 2025-07-29 NOTE — PROGRESS NOTES
Established Patient - TeleHealth Visit    The patient location is: [place]  The chief complaint leading to consultation is: chronic pain     Visit type: audiovisual    Encounter includes face to face time and non-face to face time preparing to see the patient (eg, review of tests), Obtaining and/or reviewing separately obtained history, Documenting clinical information in the electronic or other health record, Independently interpreting results (not separately reported) and communicating results to the patient/family/caregiver, or Care coordination (not separately reported).     Each patient to whom he or she provides medical services by telemedicine is:  (1) informed of the relationship between the physician and patient and the respective role of any other health care provider with respect to management of the patient; and (2) notified that he or she may decline to receive medical services by telemedicine and may withdraw from such care at any time.        Chronic Pain Note     Referring Physician: No ref. provider found    PCP: Trista Key MD    Chief Complaint:   Chief Complaint   Patient presents with    Follow-up        SUBJECTIVE:  Interval History (8/5/2025):  Patient Olegario Presley presents today for follow-up visit.  Patient is being seen for follow up of neck pain. She is doing well since her last RFA and rates her pain 0/10. Still getting good relief. No radiculopathy. She continues to take gabapentin w/o SE.   Patient denies any other complaints or concerns at this time.      Interval History (3/5/2025):  Patient Olegario Presley presents today for follow-up visit.  Patient was last seen on 1/24/2025 bilateral C4-6 RFA with 80% relief and increased ROM of the cervical spine. She hs been able to stop the flexeril because hasn't needed it and rates her pain today 0/10. No new issues or complaints at this time.    Interval History (10/30/2024):  Patient Olegario Presley presents today for follow-up visit.   Patient is being seen for follow up of neck pain. She rates her pain 5/10 today. Pain is remaining axial in the neck without radiculopathy and is worse with rotation of the neck. She has been in PT for 8 weeks and at times gets relief but other times the neck and traps feel tight. She has had some relief with dry needling when the tightness in the muscles happens.   Patient denies any other complaints or concerns at this time.    Interval history 8/1/2024  Olegario Presley is a 44 y.o. female with past medical history significant for generalized anxiety disorder, hyperlipidemia, hypertension, obesity, insulin resistance, GERD, sleep apnea who presents to the clinic for the evaluation of neck, head, arm and left knee pain.  Patient reports neck pain has been present since June without inciting accident injury or trauma.  Pain is intermittent and today is rated a 2/10.  Pain is described as sharp and stabbing in nature.  Patient reports pain in bilateral cervical paraspinous musculature which can radiate superiorly and greater and lesser occipital nerve distributions.  She does report generalized fatigue in the upper extremities and numbness and tingling in the fingertips but denies outright cervical radiculopathy.  Pain in the neck and arms can be exacerbated with cervical flexion/extension lateral rotation as well as use of the upper extremities.  Pain can be improved with heat, sitting still and recent prescription of gabapentin from her PCP, Dr. Slater.  Patient is currently taking gabapentin 300 mg twice daily.  She denies any side effects from this medication.  Patient has been performing physician directed physical therapy exercises for neck and arm pain over the last 8 weeks from 06/01/2024 through 08/01/2024 with marginal improvement in her symptoms.  Patient has not received prior interventional treatment.    She also reports new onset left knee pain.  This pain has been present for 1 day without inciting  accident injury or trauma.  Patient reports pain along the suprapatellar aspect of the left knee, which is worse with standing and ambulation.  She denies significant lower extremity weakness.  She has never received interventional treatment for knee pain.    Patient denies night fever/night sweats, urinary incontinence, bowel incontinence, significant weight loss, and significant motor weakness.  Patient reports loss of sensations.      Pain Disability Index Review:         8/1/2024     8:07 AM   Last 3 PDI Scores   Pain Disability Index (PDI) 12       Non-Pharmacologic Treatments:  Physical Therapy/Home Exercise: yes  Ice/Heat:yes  TENS: no  Acupuncture: no  Massage: no  Chiropractic: no    Other: no      Pain Medications:  - Adjuvant Medications: Cyclobenzaprine (Flexeril) and Neurontin (Gabapentin)    Pain Procedures:   1/24/2025 bilateral C4-6 RFA with 80% relief and increased ROM of the cervical spine    Past Medical History:   Diagnosis Date    Allergy     Deviated septum     Encounter for general adult medical examination without abnormal findings 09/22/2014    GERD (gastroesophageal reflux disease)     Hyperkalemia     Sleep apnea      Past Surgical History:   Procedure Laterality Date    ESOPHAGOGASTRODUODENOSCOPY N/A 5/5/2022    Procedure: EGD (ESOPHAGOGASTRODUODENOSCOPY);  Surgeon: Shreyas Newsome MD;  Location: [facility];  Service: Gastroenterology;  Laterality: N/A;    INJECTION OF ANESTHETIC AGENT AROUND MEDIAL BRANCH NERVES INNERVATING CERVICAL FACET JOINT Bilateral 11/26/2024    Procedure: Bilateral C4-6 MBB with RN IV sedation;  Surgeon: Norma Swartz MD;  Location[facility];  Service: Pain Management;  Laterality: Bilateral;    INJECTION OF ANESTHETIC AGENT AROUND MEDIAL BRANCH NERVES INNERVATING CERVICAL FACET JOINT Bilateral 12/20/2024    Procedure: Bilateral C4-6 MBB #2 with RN IV sedation;  Surgeon: Norma Swartz MD;  Location: [facility];  Service: Pain Management;   Laterality: Bilateral;    PLANTAR FASCIA SURGERY      RADIOFREQUENCY THERMOCOAGULATION Bilateral 1/24/2025    Procedure: bilateral C4-6 RFA with RN IV sedation;  Surgeon: Norma Swartz MD;  Location: [facility];  Service: Pain Management;  Laterality: Bilateral;    SINUS SURGERY       Review of patient's allergies indicates:  No Known Allergies    Current Outpatient Medications   Medication Sig    atorvastatin (LIPITOR) 20 MG tablet Take 1 tablet (20 mg total) by mouth every evening.    busPIRone (BUSPAR) 7.5 MG tablet Take 1 tablet (7.5 mg total) by mouth 2 (two) times a day.    levonorgestrel (MIRENA) 20 mcg/24 hours (5 yrs) 52 mg IUD 1 each by Intrauterine route once.    metFORMIN (GLUCOPHAGE-XR) 500 MG ER 24hr tablet 4 tabs poqd    metoprolol succinate (TOPROL XL) 50 MG 24 hr tablet Take 1 tablet (50 mg total) by mouth every evening.    omeprazole (PRILOSEC) 40 MG capsule Take 1 capsule (40 mg total) by mouth every morning.     No current facility-administered medications for this visit.         ROS:  GENERAL:  No weight loss, malaise or fevers.  HEENT:   No recent changes in vision or hearing  NECK:  Negative for lumps, no difficulty with swallowing.  RESPIRATORY:  Negative for cough, wheezing or shortness of breath, patient denies any recent URI.  CARDIOVASCULAR:  Negative for chest pain or palpitations.  GI:  Negative for abdominal discomfort, blood in stools or black stools or change in bowel habits.  MUSCULOSKELETAL:  See HPI.  SKIN:  Negative for lesions, rash, and itching.  PSYCH:  No mood disorder or recent psychosocial stressors.   HEMATOLOGY/LYMPHOLOGY:  Negative for prolonged bleeding, bruising easily or swollen nodes.    NEURO:   No history of syncope, paralysis, seizures or tremors.  All other reviewed and negative other than HPI.      Telemedicine Exam  There were no vitals filed for this visit.  There is no height or weight on file to calculate BMI.      Physical Exam: last in clinic  "visit:    OBJECTIVE:    Ht 5' 1" (1.549 m)   BMI 32.73 kg/m²       Physical Exam:    GENERAL: Well appearing, in no acute distress, alert and oriented x3.  PSYCH:  Mood and affect appropriate.  SKIN: Skin color, texture, turgor normal, no rashes or lesions.  HEAD/FACE:  Normocephalic, atraumatic. Cranial nerves grossly intact.    NECK:  pain to palpation over the cervical paraspinous muscles. Spurling Negative.  pain with neck flexion, extension, or lateral flexion. Full ROM  Normaltesting biceps, triceps and brachioradialis bilaterally.    NegativeHoffmann's bilaterally.    5/5 strength testing deltoid, biceps, triceps, wrist extensor, wrist flexor and ulnar intrinsics bilaterally.    Normal  strength bilaterally    CV: RRR with palpation of the radial artery.  PULM: No evidence of respiratory difficulty, symmetric chest rise.  GI:  Soft and non-tender.    NEURO: Bilateral upper and lower extremity coordination and muscle stretch reflexes are physiologic and symmetric. No loss of sensation is noted.  GAIT: normal.    Imaging:         ASSESSMENT: 44 y.o. year old female with     1. Cervical spondylosis        2. Bulging of cervical intervertebral disc                  PLAN:   - Interventions:  none at this time    - Anticoagulation use: No no anticoagulation     report:  Reviewed and consistent with medication use as prescribed.    - Medications:  -  Continue gabapentin 300mg BID.  We have reviewed potential side effects of this medication including daytime somnolence, weight gain and peripheral edema    Pt currently not needing flexeril 10mg QHS. --We have discussed potential deleterious side effects associated with this medication including  dry mouth, headache, blurred vision, drowsiness or dizziness, loss of appetite.  Patient expresses understanding.      We have discussed continuing judicious use of Tylenol, not to exceed 3000 mg daily to avoid acute hepatotoxicity.    - Therapy:   We discussed " continuing physical therapy to help manage the patient/s painful condition. The patient was counseled that muscle strengthening will improve the long term prognosis in regards to pain and may also help increase range of motion and mobility. They were told that one of the goals of physical therapy is that they learn how to do the exercises so that they can do them independently at home daily upon completion. The patient's questions were answered and they were agreeable to this course.    - Imaging: Reviewed available imaging (cervical x-ray) with patient and answered any questions they had regarding study.  MRI cervical spine reviewed    - Follow up visit: 6 months      The above plan and management options were discussed at length with patient. Patient is in agreement with the above and verbalized understanding.    - I discussed the goals of interventional chronic pain management with the patient on today's visit. We discussed a multimodal and systematic approach to pain.  This includes diagnostic and therapeutic injections, adjuvant pharmacologic treatment, physical therapy, and at times psychiatry.  I emphasized the importance of regular exercise, core strengthening and stretching, diet and weight loss as a cornerstone of long-term pain management.    - This condition does not require this patient to take time off of work, and the primary goal of our Pain Management services is to improve the patient's functional capacity.  - Patient Questions: Answered all of the patient's questions regarding diagnoses, therapy, treatment and next steps      Visit today included increased complexity associated with the care of the episodic problem of chronic pain which was addressed and continue to manage the longitudinal care of the patient due to the serious and/or complex managed problem(s) listed above.      Carole Hogan NP  Interventional Pain Management  [facility]    Disclaimer:  This note was prepared using  voice recognition system and is likely to have sound alike errors that may have been overlooked even after proof reading.  Please call me with any questions

## 2025-08-05 ENCOUNTER — OFFICE VISIT (OUTPATIENT)
Dept: PAIN MEDICINE | Facility: CLINIC | Age: 45
End: 2025-08-05
Payer: COMMERCIAL

## 2025-08-05 ENCOUNTER — TELEPHONE (OUTPATIENT)
Dept: PAIN MEDICINE | Facility: CLINIC | Age: 45
End: 2025-08-05

## 2025-08-05 VITALS — BODY MASS INDEX: 32.73 KG/M2 | HEIGHT: 61 IN

## 2025-08-05 DIAGNOSIS — M47.812 CERVICAL SPONDYLOSIS: Primary | ICD-10-CM

## 2025-08-05 DIAGNOSIS — M50.30 BULGING OF CERVICAL INTERVERTEBRAL DISC: ICD-10-CM

## 2025-08-05 PROCEDURE — G2211 COMPLEX E/M VISIT ADD ON: HCPCS | Mod: 95,,, | Performed by: NURSE PRACTITIONER

## 2025-08-05 PROCEDURE — 98005 SYNCH AUDIO-VIDEO EST LOW 20: CPT | Mod: 95,,, | Performed by: NURSE PRACTITIONER

## 2025-08-05 PROCEDURE — 3061F NEG MICROALBUMINURIA REV: CPT | Mod: CPTII,95,, | Performed by: NURSE PRACTITIONER

## 2025-08-05 PROCEDURE — 3044F HG A1C LEVEL LT 7.0%: CPT | Mod: CPTII,95,, | Performed by: NURSE PRACTITIONER

## 2025-08-05 PROCEDURE — 3066F NEPHROPATHY DOC TX: CPT | Mod: CPTII,95,, | Performed by: NURSE PRACTITIONER

## 2025-08-05 NOTE — TELEPHONE ENCOUNTER
Reached out to patient to schedule appointment because the provider will be out of clinic.  Apt has been made . All questions answered.       Whitley Yang